# Patient Record
Sex: MALE | Race: WHITE
[De-identification: names, ages, dates, MRNs, and addresses within clinical notes are randomized per-mention and may not be internally consistent; named-entity substitution may affect disease eponyms.]

---

## 2017-10-28 NOTE — HHI.HP
__________________________________________________





HPI


Service


[facility]


Primary Care Physician


Unknown


Admission Diagnosis





anasarca,pleural effusion, liver failure, fever


Diagnoses:  


Chief Complaint:  


Abdominal swelling


Abdominal pain


Bilateral lower extremity swelling


Travel History


International Travel<30 Days:  No


Contact w/Intl Traveler <30 Da:  No


Traveled to Known Affected Are:  No





Sepsis Criteria


SIRS Criteria (2 or more):  Temp > 100.9 or < 96.8, RR  > 20 or PaCO2 < 32


History of Present Illness


Written by Shraddha Foreman, acting as scribe for Dr. Limon on 10/28/17 at 17:

47. 





This is a 56-year-old male with a past medical history significant for alcohol 

abuse, hepatitis C untreated, hypertension and hypothyroidism who presents to 

[facility] ED with complaints of progressive swelling in his abdomen for 

the past 4 weeks.  Patient admits to history of heavy alcohol use and reports 

his last drink was 4-5 days after Labor Day.  Since that time, patient states 

she's been experiencing increased swelling in his abdomen as well as in his 

lower extremities.  About 2-3 weeks ago he began having progressive abdominal 

pain.  His daughter who is at the bedside states that she began to notice his 

eyes turning yellow around Father's Day of this year.  He is having a difficult 

time breathing.  He has begun to have some swelling in his scrotum.  He 

endorses being more tired and sleeping most of the day.  Patient has his 

initial GI appointment scheduled for this Monday and was to begin the process 

to obtain approval to begin hepatitis C treatment.  Patient denies any fever or 

chills.  Patient denies any complaints of nausea or vomiting.  He reports she's 

had a good appetite.  He denies any diarrhea or constipation.  In the ED, 

patient is febrile with temperature 102.8.  White count is within normal 

limits.  LFTs are elevated.  Platelet count is 105.


INR is 1.5.  Chest x-ray shows bilateral pleural effusions right greater than 

left and a hazy opacity greatest in the right lung.  CT of the abdomen and 

pelvis shows bilateral pleural effusions and extensive ascites as well as 

nodular contour to the liver and splenomegaly suggestive of possible portal 

venous hypertension.





Review of Systems


Except as stated in HPI:  all other systems reviewed are Neg





Past Family Social History


Past Medical History


Hypertension


Hypothyroidism


Alcohol abuse


Hepatitis C, untreated


Hx of GIB


Past Surgical History


Ganglion cyst


Tonsillectomy


Reported Medications


Levothyroxine (Levothyroxine Sodium) 50 Mcg Tab 50 Mcg PO DAILY


Protonix (Pantoprazole Sodium) 40 Mg Tabdr 40 Mg PO DAILY


Levothroid (Levothyroxine Sodium) 75 Mcg Tab 75 Mcg PO DAILY


Allergies:  


Coded Allergies:  


     No Known Allergies (Unverified , 12)


Active Ordered Medications





Current Medications








 Medications


  (Trade)  Dose


 Ordered  Sig/Mariana


 Route  Start Time


 Stop Time Status Last Admin


 


  (NS Flush)  2 ml  UNSCH  PRN


 IV FLUSH  10/28/17 14:15


     


 








Family History


Hypertension, CLL, COPD, CHF


Social History


History of previous tobacco use age [age]yr, 1 pack a day.  He admits to 

history of heavy alcohol abuse but states his last drink was over a month ago 

right after Labor Day.  Patient has a history of IV drug use but has not used 

in 25 years.





Physical Exam


Vital Signs





Vital Signs








  Date Time  Temp Pulse Resp B/P (MAP) Pulse Ox O2 Delivery O2 Flow Rate FiO2


 


10/28/17 17:03 99.7 81 18 136/84 (101) 98 Nasal Cannula 2.00 


 


10/28/17 15:18 102.8 81 18 148/88 (108) 96 Nasal Cannula 2.00 


 


10/28/17 14:18   18     


 


10/28/17 14:18   18  97 Nasal Cannula 2.00 


 


10/28/17 13:35 99.4 88 22 186/102 (130) 93   








Physical Exam


GENERAL: This is a ill appearing male in mild distress due to abdominal girth.  

Patient is awake and alert.  Daughters at the bedside.


SKIN: Jaundice.  Warm and dry.


HEAD: Atraumatic. Normocephalic. No temporal or scalp tenderness.


EYES: Pupils equal round and reactive. Extraocular motions intact. (+)scleral 

icterus. 


ENT: Nose without bleeding or purulent drainage. Throat without erythema, 

tonsillar hypertrophy or exudate. Uvula midline. Airway patent.


NECK: Trachea midline. No lymphadenopathy. Supple, nontender, no meningeal 

signs.


CARDIOVASCULAR: Regular rate and rhythm without murmurs, gallops, or rubs. 


RESPIRATORY: Diminished breath sounds throughout.


GASTROINTESTINAL: Limited exam.  Abdomen is tense.  (+)Ascites.  Diffusely 

tender to palpation.  Unable to assess for organomegaly.


MUSCULOSKELETAL: Extremities without clubbing or cyanosis. 2-3+ edema BLE.  No 

joint tenderness, effusion, or edema noted. No calf tenderness. 


NEUROLOGICAL: Awake and alert.  Able to move all extremities.  Normal speech.


Laboratory





Laboratory Tests








Test


  10/28/17


14:37 10/28/17


15:10 10/28/17


15:41


 


White Blood Count 6.0   


 


Red Blood Count 4.38   


 


Hemoglobin 15.1   


 


Hematocrit 42.9   


 


Mean Corpuscular Volume 98.0   


 


Mean Corpuscular Hemoglobin 34.4   


 


Mean Corpuscular Hemoglobin


Concent 35.1 


  


  


 


 


Red Cell Distribution Width 19.1   


 


Platelet Count 105   


 


Mean Platelet Volume 9.2   


 


Neutrophils (%) (Auto) 63.0   


 


Lymphocytes (%) (Auto) 18.7   


 


Monocytes (%) (Auto) 14.7   


 


Eosinophils (%) (Auto) 2.9   


 


Basophils (%) (Auto) 0.7   


 


Neutrophils # (Auto) 3.8   


 


Lymphocytes # (Auto) 1.1   


 


Monocytes # (Auto) 0.9   


 


Eosinophils # (Auto) 0.2   


 


Basophils # (Auto) 0.0   


 


CBC Comment DIFF FINAL   


 


Differential Comment    


 


Prothrombin Time 16.7   


 


Prothromb Time International


Ratio 1.5 


  


  


 


 


Activated Partial


Thromboplast Time 27.7 


  


  


 


 


Urine Color  YELLOW  


 


Urine Turbidity  HAZY  


 


Urine pH  6.0  


 


Urine Specific Gravity  1.012  


 


Urine Protein  NEG  


 


Urine Glucose (UA)  NEG  


 


Urine Ketones  NEG  


 


Urine Occult Blood  NEG  


 


Urine Nitrite  NEG  


 


Urine Bilirubin  NEG  


 


Urine Urobilinogen  2.0  


 


Urine Leukocyte Esterase  NEG  


 


Urine WBC  2  


 


Urine Mucus  FEW  


 


Microscopic Urinalysis Comment


  


  CULT NOT


INDICATED 


 


 


Blood Urea Nitrogen   20 


 


Creatinine   0.97 


 


Random Glucose   111 


 


Total Protein   7.7 


 


Albumin   2.2 


 


Calcium Level   7.7 


 


Alkaline Phosphatase   154 


 


Aspartate Amino Transf


(AST/SGOT) 


  


  149 


 


 


Alanine Aminotransferase


(ALT/SGPT) 


  


  67 


 


 


Total Bilirubin   6.6 


 


Sodium Level   136 


 


Potassium Level   4.2 


 


Chloride Level   105 


 


Carbon Dioxide Level   25.1 


 


Anion Gap   6 


 


Estimat Glomerular Filtration


Rate 


  


  79 


 


 


Gamma Glutamyl Transpeptidase   54 














 Date/Time


Source Procedure


Growth Status


 


 


 10/28/17 15:10


Blood Peripheral Aerobic Blood Culture


Pending Received


 


 10/28/17 15:10


Blood Peripheral Anaerobic Blood Culture


Pending Received








Result Diagram:  


10/28/17 1437                                                                  

              10/28/17 1541





Imaging





Last Impressions








Chest X-Ray 10/28/17 0000 Signed





Impressions: 





 Service Date/Time:  2017 14:27 - CONCLUSION:  1. 

Bilateral 





 pleural effusions right greater than left. 2. Hazy opacity greatest in the 

right 





 lung. 3. Mild cardiomegaly     Andrew Canada MD 


 


Abdomen/Pelvis CT 10/28/17 0000 Signed





Impressions: 





 Service Date/Time:  2017 16:11 - CONCLUSION:  1. 

Bilateral 





 pleural effusions and extensive ascites 2. Nodular contour to the liver and 





 splenomegaly suggests possible portal venous hypertension     Cl Howell MD 











Caprini VTE Risk Assessment


Caprini VTE Risk Assessment:  No/Low Risk (score <= 1)


VTE Pharm Contraindication:  Coagulopathy,INR elevated


Caprini Risk Assessment Model











 Point Value = 1          Point Value = 2  Point Value = 3  Point Value = 5


 


Age 41-60


Minor surgery


BMI > 25 kg/m2


Swollen legs


Varicose veins


Pregnancy or postpartum


History of unexplained or recurrent


   spontaneous 


Oral contraceptives or hormone


   replacement


Sepsis (< 1 month)


Serious lung disease, including


   pneumonia (< 1 month)


Abnormal pulmonary function


Acute myocardial infarction


Congestive heart failure (< 1 month)


History of inflammatory bowel disease


Medical patient at bed rest Age 61-74


Arthroscopic surgery


Major open surgery (> 45 min)


Laparoscopic surgery (> 45 min)


Malignancy


Confined to bed (> 72 hours)


Immobilizing plaster cast


Central venous access Age >= 75


History of VTE


Family history of VTE


Factor V Leiden


Prothrombin 80330F


Lupus anticoagulant


Anticardiolipin antibodies


Elevated serum homocysteine


Heparin-induced thrombocytopenia


Other congenital or acquired


   thrombophilia Stroke (< 1 month)


Elective arthroplasty


Hip, pelvis, or leg fracture


Acute spinal cord injury (< 1 month)








Prophylaxis Regimen











   Total Risk


Factor Score Risk Level Prophylaxis Regimen


 


0-1      Low Early ambulation


 


2 Moderate Order ONE of the following:


*Sequential Compression Device (SCD)


*Heparin 5000 units SQ BID


 


3-4 Higher Order ONE of the following medications:


*Heparin 5000 units SQ TID


*Enoxaparin/Lovenox 40 mg SQ daily (WT < 150 kg, CrCl > 30 mL/min)


*Enoxaparin/Lovenox 30 mg SQ daily (WT < 150 kg, CrCl > 10-29 mL/min)


*Enoxaparin/Lovenox 30 mg SQ BID (WT < 150 kg, CrCl > 30 mL/min)


AND/OR


*Sequential Compression Device (SCD)


 


5 or more Highest Order ONE of the following medications:


*Heparin 5000 units SQ TID (Preferred with Epidurals)


*Enoxaparin/Lovenox 40 mg SQ daily (WT < 150 kg, CrCl > 30 mL/min)


*Enoxaparin/Lovenox 30 mg SQ daily (WT < 150 kg, CrCl > 10-29 mL/min)


*Enoxaparin/Lovenox 30 mg SQ BID (WT < 150 kg, CrCl > 30 mL/min)


AND


*Sequential Compression Device (SCD)











Assessment and Plan


Assessment and Plan


56-year-old male with a past medical history significant for alcohol abuse, 

hepatitis C untreated, hypertension and hypothyroidism who presents to [facility] ED with complaints of progressive swelling in his abdomen for the past 4 

weeks.





SIRS, patient meets SIRS criteria with temperature 102.8 and elevated RR 22.


   - Patient given a dose of Zosyn in the ED


   - Continue IV antibiotic.  Concern for SBP.


   - Supportive care


   - Continuous cardiac monitoring


   - Follow up on blood culture results





Liver failure, likely secondary to cirrhosis 


Anasarca


History of hepatitis C, untreated


History of alcohol abuse


Transaminitis


   - CT of the abdomen and pelvis personally reviewed revealing bilateral 

pleural effusions, extensive ascites, nodular contour of the liver and 

splenomegaly suggestive of possible portal venous hypertension


   - Ultrasound-guided abdominal paracentesis.  Send specimen for fluid studies.


   - Consult GI


   - Patient given dose of IV furosemide in the ED.  Continue Lasix 40mg IV 

BID.  Monitor electrolytes.


   - Spironolactone 25 mg by mouth daily


   - strict I&Os


   - monitor UOP.  Maintain negative fluid status.


   - monitor LFTs


   - Discussed with patient importance of complete alcohol cessation





Dyspnea secondary to pleural effusions


   - Chest x-ray personally reviewed showing bilateral pleural effusions right 

greater than left and hazy opacity greatest in the right lung.


   - Obtain 2-D echo to assess for right-sided heart failure


   - IV diuresis and paracentesis


   - Will continue to monitor.  If dyspnea worsens, patient may require 

thoracentesis.  Will plan on repeat chest x-ray in a few days.





Hypertension


   - Uncontrolled at presentation but improved


   - Monitor





Coagulopathy


Thrombocytopenia


   - Likely secondary to liver disease


   - monitor for any s/sxs of bleeding


   - monitor as indicated





Hypothyroidism


   - Continue patient on home dose of levothyroxine 50 g daily





GI prophylaxis


   - PPI





DVT prophylaxis


   - Avoid chemical prophylaxis given coagulopathy


   - Bilateral SCD/MARY hose





This note was transcribed by gisela [Shraddha Foreman].  I, Dr. Francis Limon 

personally performed the history, physical exam, and medical decision making; 

and confirmed the accuracy of the information in the transcribed note.





Authenticated by Dr. Francis Limon on 10/28/17 at 1755.


Discussed Condition With


Patient, daughter, ED physician





Physician Certification


2 Midnight Certification Type:  Admission for Inpatient Services


Order for Inpatient Services


The services are ordered in accordance with Medicare regulations or non-

Medicare payer requirements, as applicable.  In the case of services not 

specified as inpatient-only, they are appropriately provided as inpatient 

services in accordance with the 2-midnight benchmark.


Estimated LOS (days):  3


3 days is the estimated time the patient will need to remain in the hospital, 

assuming treatment plan goals are met and no additional complications.


Post-Hospital Plan:  Not yet determined











Shraddha Foreman Oct 28, 2017 17:59


Francis Limon MD Oct 28, 2017 23:01

## 2017-10-28 NOTE — HHI.HP
__________________________________________________





HPI


Service


[facility]


Primary Care Physician


Unknown


Admission Diagnosis





anasarca,pleural effusion, liver failure, fever


Diagnoses:  


Chief Complaint:  


Abdominal swelling


Abdominal pain


Bilateral lower extremity swelling


Travel History


International Travel<30 Days:  No


Contact w/Intl Traveler <30 Da:  No


Traveled to Known Affected Are:  No





Sepsis Criteria


SIRS Criteria (2 or more):  Temp > 100.9 or < 96.8, RR  > 20 or PaCO2 < 32


History of Present Illness


Written by Shraddha Foreman, acting as scribe for Dr. Limon on 10/28/17 at 17:

47. 





This is a 56-year-old male with a past medical history significant for alcohol 

abuse, hepatitis C untreated, hypertension and hypothyroidism who presents to 

[facility] ED with complaints of progressive swelling in his abdomen for 

the past 4 weeks.  Patient admits to history of heavy alcohol use and reports 

his last drink was 4-5 days after Labor Day.  Since that time, patient states 

she's been experiencing increased swelling in his abdomen as well as in his 

lower extremities.  About 2-3 weeks ago he began having progressive abdominal 

pain.  His daughter who is at the bedside states that she began to notice his 

eyes turning yellow around Father's Day of this year.  He is having a difficult 

time breathing.  He has begun to have some swelling in his scrotum.  He 

endorses being more tired and sleeping most of the day.  Patient has his 

initial GI appointment scheduled for this Monday and was to begin the process 

to obtain approval to begin hepatitis C treatment.  Patient denies any fever or 

chills.  Patient denies any complaints of nausea or vomiting.  He reports she's 

had a good appetite.  He denies any diarrhea or constipation.  In the ED, 

patient is febrile with temperature 102.8.  White count is within normal 

limits.  LFTs are elevated.  Platelet count is 105.


INR is 1.5.  Chest x-ray shows bilateral pleural effusions right greater than 

left and a hazy opacity greatest in the right lung.  CT of the abdomen and 

pelvis shows bilateral pleural effusions and extensive ascites as well as 

nodular contour to the liver and splenomegaly suggestive of possible portal 

venous hypertension.





Review of Systems


Except as stated in HPI:  all other systems reviewed are Neg





Past Family Social History


Past Medical History


Hypertension


Hypothyroidism


Alcohol abuse


Hepatitis C, untreated


Hx of GIB


Past Surgical History


Ganglion cyst


Tonsillectomy


Reported Medications


Levothyroxine (Levothyroxine Sodium) 50 Mcg Tab 50 Mcg PO DAILY


Protonix (Pantoprazole Sodium) 40 Mg Tabdr 40 Mg PO DAILY


Levothroid (Levothyroxine Sodium) 75 Mcg Tab 75 Mcg PO DAILY


Allergies:  


Coded Allergies:  


     No Known Allergies (Unverified , 12)


Active Ordered Medications





Current Medications








 Medications


  (Trade)  Dose


 Ordered  Sig/Mariana


 Route  Start Time


 Stop Time Status Last Admin


 


  (NS Flush)  2 ml  UNSCH  PRN


 IV FLUSH  10/28/17 14:15


     


 








Family History


Hypertension, CLL, COPD, CHF


Social History


History of previous tobacco use age [age]yr, 1 pack a day.  He admits to 

history of heavy alcohol abuse but states his last drink was over a month ago 

right after Labor Day.  Patient has a history of IV drug use but has not used 

in 25 years.





Physical Exam


Vital Signs





Vital Signs








  Date Time  Temp Pulse Resp B/P (MAP) Pulse Ox O2 Delivery O2 Flow Rate FiO2


 


10/28/17 17:03 99.7 81 18 136/84 (101) 98 Nasal Cannula 2.00 


 


10/28/17 15:18 102.8 81 18 148/88 (108) 96 Nasal Cannula 2.00 


 


10/28/17 14:18   18     


 


10/28/17 14:18   18  97 Nasal Cannula 2.00 


 


10/28/17 13:35 99.4 88 22 186/102 (130) 93   








Physical Exam


GENERAL: This is a ill appearing male in mild distress due to abdominal girth.  

Patient is awake and alert.  Daughters at the bedside.


SKIN: Jaundice.  Warm and dry.


HEAD: Atraumatic. Normocephalic. No temporal or scalp tenderness.


EYES: Pupils equal round and reactive. Extraocular motions intact. (+)scleral 

icterus. 


ENT: Nose without bleeding or purulent drainage. Throat without erythema, 

tonsillar hypertrophy or exudate. Uvula midline. Airway patent.


NECK: Trachea midline. No lymphadenopathy. Supple, nontender, no meningeal 

signs.


CARDIOVASCULAR: Regular rate and rhythm without murmurs, gallops, or rubs. 


RESPIRATORY: Diminished breath sounds throughout.


GASTROINTESTINAL: Limited exam.  Abdomen is tense.  (+)Ascites.  Diffusely 

tender to palpation.  Unable to assess for organomegaly.


MUSCULOSKELETAL: Extremities without clubbing or cyanosis. 2-3+ edema BLE.  No 

joint tenderness, effusion, or edema noted. No calf tenderness. 


NEUROLOGICAL: Awake and alert.  Able to move all extremities.  Normal speech.


Laboratory





Laboratory Tests








Test


  10/28/17


14:37 10/28/17


15:10 10/28/17


15:41


 


White Blood Count 6.0   


 


Red Blood Count 4.38   


 


Hemoglobin 15.1   


 


Hematocrit 42.9   


 


Mean Corpuscular Volume 98.0   


 


Mean Corpuscular Hemoglobin 34.4   


 


Mean Corpuscular Hemoglobin


Concent 35.1 


  


  


 


 


Red Cell Distribution Width 19.1   


 


Platelet Count 105   


 


Mean Platelet Volume 9.2   


 


Neutrophils (%) (Auto) 63.0   


 


Lymphocytes (%) (Auto) 18.7   


 


Monocytes (%) (Auto) 14.7   


 


Eosinophils (%) (Auto) 2.9   


 


Basophils (%) (Auto) 0.7   


 


Neutrophils # (Auto) 3.8   


 


Lymphocytes # (Auto) 1.1   


 


Monocytes # (Auto) 0.9   


 


Eosinophils # (Auto) 0.2   


 


Basophils # (Auto) 0.0   


 


CBC Comment DIFF FINAL   


 


Differential Comment    


 


Prothrombin Time 16.7   


 


Prothromb Time International


Ratio 1.5 


  


  


 


 


Activated Partial


Thromboplast Time 27.7 


  


  


 


 


Urine Color  YELLOW  


 


Urine Turbidity  HAZY  


 


Urine pH  6.0  


 


Urine Specific Gravity  1.012  


 


Urine Protein  NEG  


 


Urine Glucose (UA)  NEG  


 


Urine Ketones  NEG  


 


Urine Occult Blood  NEG  


 


Urine Nitrite  NEG  


 


Urine Bilirubin  NEG  


 


Urine Urobilinogen  2.0  


 


Urine Leukocyte Esterase  NEG  


 


Urine WBC  2  


 


Urine Mucus  FEW  


 


Microscopic Urinalysis Comment


  


  CULT NOT


INDICATED 


 


 


Blood Urea Nitrogen   20 


 


Creatinine   0.97 


 


Random Glucose   111 


 


Total Protein   7.7 


 


Albumin   2.2 


 


Calcium Level   7.7 


 


Alkaline Phosphatase   154 


 


Aspartate Amino Transf


(AST/SGOT) 


  


  149 


 


 


Alanine Aminotransferase


(ALT/SGPT) 


  


  67 


 


 


Total Bilirubin   6.6 


 


Sodium Level   136 


 


Potassium Level   4.2 


 


Chloride Level   105 


 


Carbon Dioxide Level   25.1 


 


Anion Gap   6 


 


Estimat Glomerular Filtration


Rate 


  


  79 


 


 


Gamma Glutamyl Transpeptidase   54 














 Date/Time


Source Procedure


Growth Status


 


 


 10/28/17 15:10


Blood Peripheral Aerobic Blood Culture


Pending Received


 


 10/28/17 15:10


Blood Peripheral Anaerobic Blood Culture


Pending Received








Result Diagram:  


10/28/17 1437                                                                  

              10/28/17 1541





Imaging





Last Impressions








Chest X-Ray 10/28/17 0000 Signed





Impressions: 





 Service Date/Time:  2017 14:27 - CONCLUSION:  1. 

Bilateral 





 pleural effusions right greater than left. 2. Hazy opacity greatest in the 

right 





 lung. 3. Mild cardiomegaly     Andrew Canada MD 


 


Abdomen/Pelvis CT 10/28/17 0000 Signed





Impressions: 





 Service Date/Time:  2017 16:11 - CONCLUSION:  1. 

Bilateral 





 pleural effusions and extensive ascites 2. Nodular contour to the liver and 





 splenomegaly suggests possible portal venous hypertension     Cl Howell MD 











Caprini VTE Risk Assessment


Caprini VTE Risk Assessment:  No/Low Risk (score <= 1)


VTE Pharm Contraindication:  Coagulopathy,INR elevated


Caprini Risk Assessment Model











 Point Value = 1          Point Value = 2  Point Value = 3  Point Value = 5


 


Age 41-60


Minor surgery


BMI > 25 kg/m2


Swollen legs


Varicose veins


Pregnancy or postpartum


History of unexplained or recurrent


   spontaneous 


Oral contraceptives or hormone


   replacement


Sepsis (< 1 month)


Serious lung disease, including


   pneumonia (< 1 month)


Abnormal pulmonary function


Acute myocardial infarction


Congestive heart failure (< 1 month)


History of inflammatory bowel disease


Medical patient at bed rest Age 61-74


Arthroscopic surgery


Major open surgery (> 45 min)


Laparoscopic surgery (> 45 min)


Malignancy


Confined to bed (> 72 hours)


Immobilizing plaster cast


Central venous access Age >= 75


History of VTE


Family history of VTE


Factor V Leiden


Prothrombin 52642X


Lupus anticoagulant


Anticardiolipin antibodies


Elevated serum homocysteine


Heparin-induced thrombocytopenia


Other congenital or acquired


   thrombophilia Stroke (< 1 month)


Elective arthroplasty


Hip, pelvis, or leg fracture


Acute spinal cord injury (< 1 month)








Prophylaxis Regimen











   Total Risk


Factor Score Risk Level Prophylaxis Regimen


 


0-1      Low Early ambulation


 


2 Moderate Order ONE of the following:


*Sequential Compression Device (SCD)


*Heparin 5000 units SQ BID


 


3-4 Higher Order ONE of the following medications:


*Heparin 5000 units SQ TID


*Enoxaparin/Lovenox 40 mg SQ daily (WT < 150 kg, CrCl > 30 mL/min)


*Enoxaparin/Lovenox 30 mg SQ daily (WT < 150 kg, CrCl > 10-29 mL/min)


*Enoxaparin/Lovenox 30 mg SQ BID (WT < 150 kg, CrCl > 30 mL/min)


AND/OR


*Sequential Compression Device (SCD)


 


5 or more Highest Order ONE of the following medications:


*Heparin 5000 units SQ TID (Preferred with Epidurals)


*Enoxaparin/Lovenox 40 mg SQ daily (WT < 150 kg, CrCl > 30 mL/min)


*Enoxaparin/Lovenox 30 mg SQ daily (WT < 150 kg, CrCl > 10-29 mL/min)


*Enoxaparin/Lovenox 30 mg SQ BID (WT < 150 kg, CrCl > 30 mL/min)


AND


*Sequential Compression Device (SCD)











Assessment and Plan


Assessment and Plan


56-year-old male with a past medical history significant for alcohol abuse, 

hepatitis C untreated, hypertension and hypothyroidism who presents to [facility] ED with complaints of progressive swelling in his abdomen for the past 4 

weeks.





SIRS, patient meets SIRS criteria with temperature 102.8 and elevated RR 22.


   - Patient given a dose of Zosyn in the ED


   - Continue IV antibiotic.  Concern for SBP.


   - Supportive care


   - Continuous cardiac monitoring


   - Follow up on blood culture results





Liver failure, likely secondary to cirrhosis 


Anasarca


History of hepatitis C, untreated


History of alcohol abuse


Transaminitis


   - CT of the abdomen and pelvis personally reviewed revealing bilateral 

pleural effusions, extensive ascites, nodular contour of the liver and 

splenomegaly suggestive of possible portal venous hypertension


   - Ultrasound-guided abdominal paracentesis.  Send specimen for fluid studies.


   - Consult GI


   - Patient given dose of IV furosemide in the ED.  Continue Lasix 40mg IV 

BID.  Monitor electrolytes.


   - Spironolactone 25 mg by mouth daily


   - strict I&Os


   - monitor UOP.  Maintain negative fluid status.


   - monitor LFTs


   - Discussed with patient importance of complete alcohol cessation





Dyspnea secondary to pleural effusions


   - Chest x-ray personally reviewed showing bilateral pleural effusions right 

greater than left and hazy opacity greatest in the right lung.


   - Obtain 2-D echo to assess for right-sided heart failure


   - IV diuresis and paracentesis


   - Will continue to monitor.  If dyspnea worsens, patient may require 

thoracentesis.  Will plan on repeat chest x-ray in a few days.





Hypertension


   - Uncontrolled at presentation but improved


   - Monitor





Coagulopathy


Thrombocytopenia


   - Likely secondary to liver disease


   - monitor for any s/sxs of bleeding


   - monitor as indicated





Hypothyroidism


   - Continue patient on home dose of levothyroxine 50 g daily





GI prophylaxis


   - PPI





DVT prophylaxis


   - Avoid chemical prophylaxis given coagulopathy


   - Bilateral SCD/MARY hose





This note was transcribed by gisela [Shraddha Foreman].  I, Dr. Francis Limon 

personally performed the history, physical exam, and medical decision making; 

and confirmed the accuracy of the information in the transcribed note.





Authenticated by Dr. Francis Limon on 10/28/17 at 1755.


Discussed Condition With


Patient, daughter, ED physician





Physician Certification


2 Midnight Certification Type:  Admission for Inpatient Services


Order for Inpatient Services


The services are ordered in accordance with Medicare regulations or non-

Medicare payer requirements, as applicable.  In the case of services not 

specified as inpatient-only, they are appropriately provided as inpatient 

services in accordance with the 2-midnight benchmark.


Estimated LOS (days):  3


3 days is the estimated time the patient will need to remain in the hospital, 

assuming treatment plan goals are met and no additional complications.


Post-Hospital Plan:  Not yet determined











Shraddha Foreman Oct 28, 2017 17:59


Francis Limon MD Oct 28, 2017 23:01

## 2017-10-28 NOTE — PD
HPI


Chief Complaint:  Abdominal Pain


Time Seen by Provider:  13:44


Travel History


International Travel<30 days:  No


Contact w/Intl Traveler<30days:  No


Traveled to known affect area:  No





History of Present Illness


HPI


This patient complains of excessive swelling of his legs including scrotum and 

extreme swelling of the abdomen.  Duration 3-4 weeks.  Patient has history of 

hepatitis C diagnosed in 2012 and has a long history of excessive alcohol use 

although he has cut back recently.  He hasn't drank in the last month.  He's 

been referred to GI physician recently but has not seen him yet.  Symptoms are 

severe.  He is jaundiced.  No alleviating factors.  Symptoms exacerbated by 

alcohol abuse and hep C.  No IV drug use in the last 25 years.





PFSH


Past Medical History


Cancer:  No


Cardiovascular Problems:  No


Diabetes:  No


Endocrine:  Yes


Genitourinary:  No


Musculoskeletal:  No


Neurologic:  No


Psychiatric:  No


Reproductive:  No


Respiratory:  No


Thyroid Disease:  Yes





Past Surgical History


Pacemaker:  No


Tonsillectomy:  Yes


Other Surgery:  Yes (GANGLION CYST LEFT WRIST )





Social History


Alcohol Use:  Yes (WEEKLY BEERS)


Tobacco Use:  No


Substance Use:  No





Allergies-Medications


(Allergen,Severity, Reaction):  


Coded Allergies:  


     No Known Allergies (Unverified , 6/13/12)


Reported Meds & Prescriptions





Reported Meds & Active Scripts


Active


Reported


Levothyroxine (Levothyroxine Sodium) 50 Mcg Tab 50 Mcg PO DAILY


Protonix (Pantoprazole Sodium) 40 Mg Tabdr 40 Mg PO DAILY


Levothroid (Levothyroxine Sodium) 75 Mcg Tab 75 Mcg PO DAILY








Review of Systems


General / Constitutional:  No: Fever


Eyes:  No: Visual changes


HENT:  No: Headaches


Cardiovascular:  Positive: Edema, No: Chest Pain or Discomfort


Respiratory:  Positive: Shortness of Breath


Gastrointestinal:  No: Abdominal Pain


Genitourinary:  No: Dysuria


Musculoskeletal:  Positive: Edema, No: Pain


Skin:  No Rash


Neurologic:  No: Weakness


Psychiatric:  Positive: Substance Abuse, No: Depression


Endocrine:  No: Polydipsia


Hematologic/Lymphatic:  No: Easy Bruising





Physical Exam


Narrative


GENERAL: Well-nourished, well-developed patient in no apparent distress.


SKIN: Focused skin assessment reveals no rash and nodules. Skin is Warm and 

dry.  Has a jaundiced coloration


HEAD: Atraumatic. Normocephalic. 


EYES: Pupils equal and round.  Positive scleral icterus. No injection or 

drainage. 


ENT: No nasal bleeding or discharge.  Mucous membranes pink and moist.


NECK: Trachea midline. No JVD. 


CARDIOVASCULAR: Regular rate and rhythm.  No murmur appreciated.


RESPIRATORY: No accessory muscle use. Clear to auscultation. Breath sounds 

equal bilaterally. 


GASTROINTESTINAL: Abdomen soft, non-tender, massive distention from ascites 

Hepatic and splenic margins not palpable.  Scrotum is enlarged diffusely


MUSCULOSKELETAL: No obvious deformities. No clubbing.  No cyanosis.  Pitting 

edema of the upper and lower legs and feet.  No edema. 


NEUROLOGICAL: Awake and alert. No obvious cranial nerve deficits.  Motor 

grossly within normal limits. Normal speech.


PSYCHIATRIC: Appropriate mood and affect; insight and judgment poor .





Data


Data


Last Documented VS





Vital Signs








  Date Time  Temp Pulse Resp B/P (MAP) Pulse Ox O2 Delivery O2 Flow Rate FiO2


 


10/28/17 15:18 102.8 81 18 148/88 (108) 96 Nasal Cannula 2.00 








Orders





 Orders


Complete Blood Count With Diff (10/28/17 14:03)


Comprehensive Metabolic Panel (10/28/17 14:03)


Prothrombin Time / Inr (Pt) (10/28/17 14:03)


Act Partial Throm Time (Ptt) (10/28/17 14:03)


Iv Access Insert/Monitor (10/28/17 14:03)


Ecg Monitoring (10/28/17 14:03)


Oximetry (10/28/17 14:03)


Sodium Chloride 0.9% Flush (Ns Flush) (10/28/17 14:15)


Furosemide Inj (Lasix Inj) (10/28/17 14:15)


Chest, Single Ap (10/28/17 )


Gamma Gt (Ggt) (10/28/17 14:10)


Ct Abd/Pel W/O Iv Contrast (10/28/17 )


Blood Culture (10/28/17 14:51)


Piperacil-Tazo 3.375 Gm Premix (Zosyn 3. (10/28/17 15:00)


Urinalysis - C+S If Indicated (10/28/17 14:51)





Labs





Laboratory Tests








Test


  10/28/17


14:37 10/28/17


15:10 10/28/17


15:41


 


White Blood Count 6.0 TH/MM3   


 


Red Blood Count 4.38 MIL/MM3   


 


Hemoglobin 15.1 GM/DL   


 


Hematocrit 42.9 %   


 


Mean Corpuscular Volume 98.0 FL   


 


Mean Corpuscular Hemoglobin 34.4 PG   


 


Mean Corpuscular Hemoglobin


Concent 35.1 % 


  


  


 


 


Red Cell Distribution Width 19.1 %   


 


Platelet Count 105 TH/MM3   


 


Mean Platelet Volume 9.2 FL   


 


Neutrophils (%) (Auto) 63.0 %   


 


Lymphocytes (%) (Auto) 18.7 %   


 


Monocytes (%) (Auto) 14.7 %   


 


Eosinophils (%) (Auto) 2.9 %   


 


Basophils (%) (Auto) 0.7 %   


 


Neutrophils # (Auto) 3.8 TH/MM3   


 


Lymphocytes # (Auto) 1.1 TH/MM3   


 


Monocytes # (Auto) 0.9 TH/MM3   


 


Eosinophils # (Auto) 0.2 TH/MM3   


 


Basophils # (Auto) 0.0 TH/MM3   


 


CBC Comment DIFF FINAL   


 


Differential Comment    


 


Prothrombin Time 16.7 SEC   


 


Prothromb Time International


Ratio 1.5 RATIO 


  


  


 


 


Activated Partial


Thromboplast Time 27.7 SEC 


  


  


 


 


Urine Color  YELLOW  


 


Urine Turbidity  HAZY  


 


Urine pH  6.0  


 


Urine Specific Gravity  1.012  


 


Urine Protein  NEG mg/dL  


 


Urine Glucose (UA)  NEG mg/dL  


 


Urine Ketones  NEG mg/dL  


 


Urine Occult Blood  NEG  


 


Urine Nitrite  NEG  


 


Urine Bilirubin  NEG  


 


Urine Urobilinogen  2.0 MG/DL  


 


Urine Leukocyte Esterase  NEG  


 


Urine WBC  2 /hpf  


 


Urine Mucus  FEW /lpf  


 


Microscopic Urinalysis Comment


  


  CULT NOT


INDICATED 


 


 


Blood Urea Nitrogen   20 MG/DL 


 


Creatinine   0.97 MG/DL 


 


Random Glucose   111 MG/DL 


 


Total Protein   7.7 GM/DL 


 


Albumin   2.2 GM/DL 


 


Calcium Level   7.7 MG/DL 


 


Alkaline Phosphatase   154 U/L 


 


Aspartate Amino Transf


(AST/SGOT) 


  


  149 U/L 


 


 


Alanine Aminotransferase


(ALT/SGPT) 


  


  67 U/L 


 


 


Total Bilirubin   6.6 MG/DL 


 


Sodium Level   136 MEQ/L 


 


Potassium Level   4.2 MEQ/L 


 


Chloride Level   105 MEQ/L 


 


Carbon Dioxide Level   25.1 MEQ/L 


 


Anion Gap   6 MEQ/L 


 


Estimat Glomerular Filtration


Rate 


  


  79 ML/MIN 


 


 


Gamma Glutamyl Transpeptidase   54 U/L 











[facility]


Medical Decision Making


Medical Screen Exam Complete:  Yes


Emergency Medical Condition:  Yes


Medical Record Reviewed:  Yes


Differential Diagnosis


Anasarca, liver failure, renal failure


Narrative Course


I have reviewed the patient's electronic medical record.





IV placed


CBC shows normal hemoglobin with mild thrombocytopenia 


metabolic profile with normal renal function


LFT's show elevated bili at 6.6 and normal GGT with fairly normal LFTs


Coagulation studies show INR 1.5, elevated


CT of abdomen and pelvis has been done but reading is pending


GGT is normal


I gave him 60 mg IV Lasix


I reviewed his chest x-ray which reveals significant right sided pleural 

effusion and smaller left pleural effusion and question of hazy right sided 

infiltrate


Patient initially was afebrile but on recheck temperature was 102.8


I obtained 2 blood cultures and gave him a dose of IV Zosyn


He has massive ascites but no abdominal pain or abdominal tenderness to suggest 

SBP but that is a possibility


Urine is clean





Patient will require inpatient admission.  I paged hospitalist to discuss.  He 

has massive anasarca and ascites with dyspnea and pleural effusions as well as 

febrile illness.  He has liver failure likely from hep C and alcoholism.  He is 

jaundiced.  CT was done to evaluate for potential obstructive lesion although 

this is not highly suspected.  The reading is pending at this time.





Diagnosis





 Primary Impression:  


 Anasarca


 Additional Impressions:  


 Acute liver failure


 Qualified Codes:  K72.00 - Acute and subacute hepatic failure without coma


 Pleural effusion associated with hepatic disorder


 Fever


 Qualified Codes:  R50.9 - Fever, unspecified





Admitting Information


Admitting Physician Requests:  it











Alok Reyes MD Oct 28, 2017 14:09

## 2017-10-28 NOTE — PD
HPI


Chief Complaint:  Abdominal Pain


Time Seen by Provider:  13:44


Travel History


International Travel<30 days:  No


Contact w/Intl Traveler<30days:  No


Traveled to known affect area:  No





History of Present Illness


HPI


This patient complains of excessive swelling of his legs including scrotum and 

extreme swelling of the abdomen.  Duration 3-4 weeks.  Patient has history of 

hepatitis C diagnosed in 2012 and has a long history of excessive alcohol use 

although he has cut back recently.  He hasn't drank in the last month.  He's 

been referred to GI physician recently but has not seen him yet.  Symptoms are 

severe.  He is jaundiced.  No alleviating factors.  Symptoms exacerbated by 

alcohol abuse and hep C.  No IV drug use in the last 25 years.





PFSH


Past Medical History


Cancer:  No


Cardiovascular Problems:  No


Diabetes:  No


Endocrine:  Yes


Genitourinary:  No


Musculoskeletal:  No


Neurologic:  No


Psychiatric:  No


Reproductive:  No


Respiratory:  No


Thyroid Disease:  Yes





Past Surgical History


Pacemaker:  No


Tonsillectomy:  Yes


Other Surgery:  Yes (GANGLION CYST LEFT WRIST )





Social History


Alcohol Use:  Yes (WEEKLY BEERS)


Tobacco Use:  No


Substance Use:  No





Allergies-Medications


(Allergen,Severity, Reaction):  


Coded Allergies:  


     No Known Allergies (Unverified , 6/13/12)


Reported Meds & Prescriptions





Reported Meds & Active Scripts


Active


Reported


Levothyroxine (Levothyroxine Sodium) 50 Mcg Tab 50 Mcg PO DAILY


Protonix (Pantoprazole Sodium) 40 Mg Tabdr 40 Mg PO DAILY


Levothroid (Levothyroxine Sodium) 75 Mcg Tab 75 Mcg PO DAILY








Review of Systems


General / Constitutional:  No: Fever


Eyes:  No: Visual changes


HENT:  No: Headaches


Cardiovascular:  Positive: Edema, No: Chest Pain or Discomfort


Respiratory:  Positive: Shortness of Breath


Gastrointestinal:  No: Abdominal Pain


Genitourinary:  No: Dysuria


Musculoskeletal:  Positive: Edema, No: Pain


Skin:  No Rash


Neurologic:  No: Weakness


Psychiatric:  Positive: Substance Abuse, No: Depression


Endocrine:  No: Polydipsia


Hematologic/Lymphatic:  No: Easy Bruising





Physical Exam


Narrative


GENERAL: Well-nourished, well-developed patient in no apparent distress.


SKIN: Focused skin assessment reveals no rash and nodules. Skin is Warm and 

dry.  Has a jaundiced coloration


HEAD: Atraumatic. Normocephalic. 


EYES: Pupils equal and round.  Positive scleral icterus. No injection or 

drainage. 


ENT: No nasal bleeding or discharge.  Mucous membranes pink and moist.


NECK: Trachea midline. No JVD. 


CARDIOVASCULAR: Regular rate and rhythm.  No murmur appreciated.


RESPIRATORY: No accessory muscle use. Clear to auscultation. Breath sounds 

equal bilaterally. 


GASTROINTESTINAL: Abdomen soft, non-tender, massive distention from ascites 

Hepatic and splenic margins not palpable.  Scrotum is enlarged diffusely


MUSCULOSKELETAL: No obvious deformities. No clubbing.  No cyanosis.  Pitting 

edema of the upper and lower legs and feet.  No edema. 


NEUROLOGICAL: Awake and alert. No obvious cranial nerve deficits.  Motor 

grossly within normal limits. Normal speech.


PSYCHIATRIC: Appropriate mood and affect; insight and judgment poor .





Data


Data


Last Documented VS





Vital Signs








  Date Time  Temp Pulse Resp B/P (MAP) Pulse Ox O2 Delivery O2 Flow Rate FiO2


 


10/28/17 15:18 102.8 81 18 148/88 (108) 96 Nasal Cannula 2.00 








Orders





 Orders


Complete Blood Count With Diff (10/28/17 14:03)


Comprehensive Metabolic Panel (10/28/17 14:03)


Prothrombin Time / Inr (Pt) (10/28/17 14:03)


Act Partial Throm Time (Ptt) (10/28/17 14:03)


Iv Access Insert/Monitor (10/28/17 14:03)


Ecg Monitoring (10/28/17 14:03)


Oximetry (10/28/17 14:03)


Sodium Chloride 0.9% Flush (Ns Flush) (10/28/17 14:15)


Furosemide Inj (Lasix Inj) (10/28/17 14:15)


Chest, Single Ap (10/28/17 )


Gamma Gt (Ggt) (10/28/17 14:10)


Ct Abd/Pel W/O Iv Contrast (10/28/17 )


Blood Culture (10/28/17 14:51)


Piperacil-Tazo 3.375 Gm Premix (Zosyn 3. (10/28/17 15:00)


Urinalysis - C+S If Indicated (10/28/17 14:51)





Labs





Laboratory Tests








Test


  10/28/17


14:37 10/28/17


15:10 10/28/17


15:41


 


White Blood Count 6.0 TH/MM3   


 


Red Blood Count 4.38 MIL/MM3   


 


Hemoglobin 15.1 GM/DL   


 


Hematocrit 42.9 %   


 


Mean Corpuscular Volume 98.0 FL   


 


Mean Corpuscular Hemoglobin 34.4 PG   


 


Mean Corpuscular Hemoglobin


Concent 35.1 % 


  


  


 


 


Red Cell Distribution Width 19.1 %   


 


Platelet Count 105 TH/MM3   


 


Mean Platelet Volume 9.2 FL   


 


Neutrophils (%) (Auto) 63.0 %   


 


Lymphocytes (%) (Auto) 18.7 %   


 


Monocytes (%) (Auto) 14.7 %   


 


Eosinophils (%) (Auto) 2.9 %   


 


Basophils (%) (Auto) 0.7 %   


 


Neutrophils # (Auto) 3.8 TH/MM3   


 


Lymphocytes # (Auto) 1.1 TH/MM3   


 


Monocytes # (Auto) 0.9 TH/MM3   


 


Eosinophils # (Auto) 0.2 TH/MM3   


 


Basophils # (Auto) 0.0 TH/MM3   


 


CBC Comment DIFF FINAL   


 


Differential Comment    


 


Prothrombin Time 16.7 SEC   


 


Prothromb Time International


Ratio 1.5 RATIO 


  


  


 


 


Activated Partial


Thromboplast Time 27.7 SEC 


  


  


 


 


Urine Color  YELLOW  


 


Urine Turbidity  HAZY  


 


Urine pH  6.0  


 


Urine Specific Gravity  1.012  


 


Urine Protein  NEG mg/dL  


 


Urine Glucose (UA)  NEG mg/dL  


 


Urine Ketones  NEG mg/dL  


 


Urine Occult Blood  NEG  


 


Urine Nitrite  NEG  


 


Urine Bilirubin  NEG  


 


Urine Urobilinogen  2.0 MG/DL  


 


Urine Leukocyte Esterase  NEG  


 


Urine WBC  2 /hpf  


 


Urine Mucus  FEW /lpf  


 


Microscopic Urinalysis Comment


  


  CULT NOT


INDICATED 


 


 


Blood Urea Nitrogen   20 MG/DL 


 


Creatinine   0.97 MG/DL 


 


Random Glucose   111 MG/DL 


 


Total Protein   7.7 GM/DL 


 


Albumin   2.2 GM/DL 


 


Calcium Level   7.7 MG/DL 


 


Alkaline Phosphatase   154 U/L 


 


Aspartate Amino Transf


(AST/SGOT) 


  


  149 U/L 


 


 


Alanine Aminotransferase


(ALT/SGPT) 


  


  67 U/L 


 


 


Total Bilirubin   6.6 MG/DL 


 


Sodium Level   136 MEQ/L 


 


Potassium Level   4.2 MEQ/L 


 


Chloride Level   105 MEQ/L 


 


Carbon Dioxide Level   25.1 MEQ/L 


 


Anion Gap   6 MEQ/L 


 


Estimat Glomerular Filtration


Rate 


  


  79 ML/MIN 


 


 


Gamma Glutamyl Transpeptidase   54 U/L 











[facility]


Medical Decision Making


Medical Screen Exam Complete:  Yes


Emergency Medical Condition:  Yes


Medical Record Reviewed:  Yes


Differential Diagnosis


Anasarca, liver failure, renal failure


Narrative Course


I have reviewed the patient's electronic medical record.





IV placed


CBC shows normal hemoglobin with mild thrombocytopenia 


metabolic profile with normal renal function


LFT's show elevated bili at 6.6 and normal GGT with fairly normal LFTs


Coagulation studies show INR 1.5, elevated


CT of abdomen and pelvis has been done but reading is pending


GGT is normal


I gave him 60 mg IV Lasix


I reviewed his chest x-ray which reveals significant right sided pleural 

effusion and smaller left pleural effusion and question of hazy right sided 

infiltrate


Patient initially was afebrile but on recheck temperature was 102.8


I obtained 2 blood cultures and gave him a dose of IV Zosyn


He has massive ascites but no abdominal pain or abdominal tenderness to suggest 

SBP but that is a possibility


Urine is clean





Patient will require inpatient admission.  I paged hospitalist to discuss.  He 

has massive anasarca and ascites with dyspnea and pleural effusions as well as 

febrile illness.  He has liver failure likely from hep C and alcoholism.  He is 

jaundiced.  CT was done to evaluate for potential obstructive lesion although 

this is not highly suspected.  The reading is pending at this time.





Diagnosis





 Primary Impression:  


 Anasarca


 Additional Impressions:  


 Acute liver failure


 Qualified Codes:  K72.00 - Acute and subacute hepatic failure without coma


 Pleural effusion associated with hepatic disorder


 Fever


 Qualified Codes:  R50.9 - Fever, unspecified





Admitting Information


Admitting Physician Requests:  it











Alok Reyes MD Oct 28, 2017 14:09

## 2017-10-28 NOTE — PD
HPI


Chief Complaint:  Abdominal Pain


Time Seen by Provider:  13:44


Travel History


International Travel<30 days:  No


Contact w/Intl Traveler<30days:  No


Traveled to known affect area:  No





History of Present Illness


HPI


This patient complains of excessive swelling of his legs including scrotum and 

extreme swelling of the abdomen.  Duration 3-4 weeks.  Patient has history of 

hepatitis C diagnosed in 2012 and has a long history of excessive alcohol use 

although he has cut back recently.  He hasn't drank in the last month.  He's 

been referred to GI physician recently but has not seen him yet.  Symptoms are 

severe.  He is jaundiced.  No alleviating factors.  Symptoms exacerbated by 

alcohol abuse and hep C.  No IV drug use in the last 25 years.





PFSH


Past Medical History


Cancer:  No


Cardiovascular Problems:  No


Diabetes:  No


Endocrine:  Yes


Genitourinary:  No


Musculoskeletal:  No


Neurologic:  No


Psychiatric:  No


Reproductive:  No


Respiratory:  No


Thyroid Disease:  Yes





Past Surgical History


Pacemaker:  No


Tonsillectomy:  Yes


Other Surgery:  Yes (GANGLION CYST LEFT WRIST )





Social History


Alcohol Use:  Yes (WEEKLY BEERS)


Tobacco Use:  No


Substance Use:  No





Allergies-Medications


(Allergen,Severity, Reaction):  


Coded Allergies:  


     No Known Allergies (Unverified , 6/13/12)


Reported Meds & Prescriptions





Reported Meds & Active Scripts


Active


Reported


Levothyroxine (Levothyroxine Sodium) 50 Mcg Tab 50 Mcg PO DAILY


Protonix (Pantoprazole Sodium) 40 Mg Tabdr 40 Mg PO DAILY


Levothroid (Levothyroxine Sodium) 75 Mcg Tab 75 Mcg PO DAILY








Review of Systems


General / Constitutional:  No: Fever


Eyes:  No: Visual changes


HENT:  No: Headaches


Cardiovascular:  Positive: Edema, No: Chest Pain or Discomfort


Respiratory:  Positive: Shortness of Breath


Gastrointestinal:  No: Abdominal Pain


Genitourinary:  No: Dysuria


Musculoskeletal:  Positive: Edema, No: Pain


Skin:  No Rash


Neurologic:  No: Weakness


Psychiatric:  Positive: Substance Abuse, No: Depression


Endocrine:  No: Polydipsia


Hematologic/Lymphatic:  No: Easy Bruising





Physical Exam


Narrative


GENERAL: Well-nourished, well-developed patient in no apparent distress.


SKIN: Focused skin assessment reveals no rash and nodules. Skin is Warm and 

dry.  Has a jaundiced coloration


HEAD: Atraumatic. Normocephalic. 


EYES: Pupils equal and round.  Positive scleral icterus. No injection or 

drainage. 


ENT: No nasal bleeding or discharge.  Mucous membranes pink and moist.


NECK: Trachea midline. No JVD. 


CARDIOVASCULAR: Regular rate and rhythm.  No murmur appreciated.


RESPIRATORY: No accessory muscle use. Clear to auscultation. Breath sounds 

equal bilaterally. 


GASTROINTESTINAL: Abdomen soft, non-tender, massive distention from ascites 

Hepatic and splenic margins not palpable.  Scrotum is enlarged diffusely


MUSCULOSKELETAL: No obvious deformities. No clubbing.  No cyanosis.  Pitting 

edema of the upper and lower legs and feet.  No edema. 


NEUROLOGICAL: Awake and alert. No obvious cranial nerve deficits.  Motor 

grossly within normal limits. Normal speech.


PSYCHIATRIC: Appropriate mood and affect; insight and judgment poor .





Data


Data


Last Documented VS





Vital Signs








  Date Time  Temp Pulse Resp B/P (MAP) Pulse Ox O2 Delivery O2 Flow Rate FiO2


 


10/28/17 15:18 102.8 81 18 148/88 (108) 96 Nasal Cannula 2.00 








Orders





 Orders


Complete Blood Count With Diff (10/28/17 14:03)


Comprehensive Metabolic Panel (10/28/17 14:03)


Prothrombin Time / Inr (Pt) (10/28/17 14:03)


Act Partial Throm Time (Ptt) (10/28/17 14:03)


Iv Access Insert/Monitor (10/28/17 14:03)


Ecg Monitoring (10/28/17 14:03)


Oximetry (10/28/17 14:03)


Sodium Chloride 0.9% Flush (Ns Flush) (10/28/17 14:15)


Furosemide Inj (Lasix Inj) (10/28/17 14:15)


Chest, Single Ap (10/28/17 )


Gamma Gt (Ggt) (10/28/17 14:10)


Ct Abd/Pel W/O Iv Contrast (10/28/17 )


Blood Culture (10/28/17 14:51)


Piperacil-Tazo 3.375 Gm Premix (Zosyn 3. (10/28/17 15:00)


Urinalysis - C+S If Indicated (10/28/17 14:51)





Labs





Laboratory Tests








Test


  10/28/17


14:37 10/28/17


15:10 10/28/17


15:41


 


White Blood Count 6.0 TH/MM3   


 


Red Blood Count 4.38 MIL/MM3   


 


Hemoglobin 15.1 GM/DL   


 


Hematocrit 42.9 %   


 


Mean Corpuscular Volume 98.0 FL   


 


Mean Corpuscular Hemoglobin 34.4 PG   


 


Mean Corpuscular Hemoglobin


Concent 35.1 % 


  


  


 


 


Red Cell Distribution Width 19.1 %   


 


Platelet Count 105 TH/MM3   


 


Mean Platelet Volume 9.2 FL   


 


Neutrophils (%) (Auto) 63.0 %   


 


Lymphocytes (%) (Auto) 18.7 %   


 


Monocytes (%) (Auto) 14.7 %   


 


Eosinophils (%) (Auto) 2.9 %   


 


Basophils (%) (Auto) 0.7 %   


 


Neutrophils # (Auto) 3.8 TH/MM3   


 


Lymphocytes # (Auto) 1.1 TH/MM3   


 


Monocytes # (Auto) 0.9 TH/MM3   


 


Eosinophils # (Auto) 0.2 TH/MM3   


 


Basophils # (Auto) 0.0 TH/MM3   


 


CBC Comment DIFF FINAL   


 


Differential Comment    


 


Prothrombin Time 16.7 SEC   


 


Prothromb Time International


Ratio 1.5 RATIO 


  


  


 


 


Activated Partial


Thromboplast Time 27.7 SEC 


  


  


 


 


Urine Color  YELLOW  


 


Urine Turbidity  HAZY  


 


Urine pH  6.0  


 


Urine Specific Gravity  1.012  


 


Urine Protein  NEG mg/dL  


 


Urine Glucose (UA)  NEG mg/dL  


 


Urine Ketones  NEG mg/dL  


 


Urine Occult Blood  NEG  


 


Urine Nitrite  NEG  


 


Urine Bilirubin  NEG  


 


Urine Urobilinogen  2.0 MG/DL  


 


Urine Leukocyte Esterase  NEG  


 


Urine WBC  2 /hpf  


 


Urine Mucus  FEW /lpf  


 


Microscopic Urinalysis Comment


  


  CULT NOT


INDICATED 


 


 


Blood Urea Nitrogen   20 MG/DL 


 


Creatinine   0.97 MG/DL 


 


Random Glucose   111 MG/DL 


 


Total Protein   7.7 GM/DL 


 


Albumin   2.2 GM/DL 


 


Calcium Level   7.7 MG/DL 


 


Alkaline Phosphatase   154 U/L 


 


Aspartate Amino Transf


(AST/SGOT) 


  


  149 U/L 


 


 


Alanine Aminotransferase


(ALT/SGPT) 


  


  67 U/L 


 


 


Total Bilirubin   6.6 MG/DL 


 


Sodium Level   136 MEQ/L 


 


Potassium Level   4.2 MEQ/L 


 


Chloride Level   105 MEQ/L 


 


Carbon Dioxide Level   25.1 MEQ/L 


 


Anion Gap   6 MEQ/L 


 


Estimat Glomerular Filtration


Rate 


  


  79 ML/MIN 


 


 


Gamma Glutamyl Transpeptidase   54 U/L 











[facility]


Medical Decision Making


Medical Screen Exam Complete:  Yes


Emergency Medical Condition:  Yes


Medical Record Reviewed:  Yes


Differential Diagnosis


Anasarca, liver failure, renal failure


Narrative Course


I have reviewed the patient's electronic medical record.





IV placed


CBC shows normal hemoglobin with mild thrombocytopenia 


metabolic profile with normal renal function


LFT's show elevated bili at 6.6 and normal GGT with fairly normal LFTs


Coagulation studies show INR 1.5, elevated


CT of abdomen and pelvis has been done but reading is pending


GGT is normal


I gave him 60 mg IV Lasix


I reviewed his chest x-ray which reveals significant right sided pleural 

effusion and smaller left pleural effusion and question of hazy right sided 

infiltrate


Patient initially was afebrile but on recheck temperature was 102.8


I obtained 2 blood cultures and gave him a dose of IV Zosyn


He has massive ascites but no abdominal pain or abdominal tenderness to suggest 

SBP but that is a possibility


Urine is clean





Patient will require inpatient admission.  I paged hospitalist to discuss.  He 

has massive anasarca and ascites with dyspnea and pleural effusions as well as 

febrile illness.  He has liver failure likely from hep C and alcoholism.  He is 

jaundiced.  CT was done to evaluate for potential obstructive lesion although 

this is not highly suspected.  The reading is pending at this time.





Diagnosis





 Primary Impression:  


 Anasarca


 Additional Impressions:  


 Acute liver failure


 Qualified Codes:  K72.00 - Acute and subacute hepatic failure without coma


 Pleural effusion associated with hepatic disorder


 Fever


 Qualified Codes:  R50.9 - Fever, unspecified





Admitting Information


Admitting Physician Requests:  it











Alok Reyes MD Oct 28, 2017 14:09

## 2017-10-28 NOTE — RADRPT
EXAM DATE/TIME:  10/28/2017 16:11 

 

[facility] COMPARISON:     

No previous studies available for comparison.

 

 

INDICATIONS :     

Abdomen pain and distension. 

                  

 

ORAL CONTRAST:      

No oral contrast ingested.

                  

 

RADIATION DOSE:     

23.35 CTDIvol (mGy) 

 

 

MEDICAL HISTORY :     

None  

 

SURGICAL HISTORY :      

None. 

 

ENCOUNTER:      

Initial

 

ACUITY:      

1 day

 

PAIN SCALE:      

6/10

 

LOCATION:       

Bilateral  abdomen

 

TECHNIQUE:     

Volumetric scanning of the abdomen and pelvis was performed.  Using automated exposure control and ad
justment of the mA and/or kV according to patient size, radiation dose was kept as low as reasonably 
achievable to obtain optimal diagnostic quality images.  DICOM format image data is available electro
nically for review and comparison.  

 

FINDINGS:     

The examination is performed without intravenous and without oral contrast.  There is a large right p
leural effusion measuring up to 7 cm in thickness.  There is a small left pleural effusion measuring 
2 cm.  There is extensive ascites throughout the abdomen pelvis causing a protuberant abdomen.  Loops
 of small bowel are deviated medially.  No definite dilated loops seen.  There is a nodular configura
tion to the omentum which is nonspecific cannot be further characterized on a noncontrast study.

 

There is same nodular surface contour of the liver and atrophy of the right lobe of the liver.  The s
pleen is enlarged measuring 16 cm in superior/inferior dimension.  No calcified gallstones.  The kidn
eys are unremarkable for noncontrast technique.

 

There is induration of the subcutaneous fat about the anterior lateral pelvic region.  No radiopaque 
foreign bodies.  Urinary bladder margins are smooth.

 

CONCLUSION:     

1. Bilateral pleural effusions and extensive ascites

2. Nodular contour to the liver and splenomegaly suggests possible portal venous hypertension

 

 

 

 Cl Howell MD on October 28, 2017 at 16:42           

Board Certified Radiologist.

 This report was verified electronically.

## 2017-10-28 NOTE — RADRPT
EXAM DATE/TIME:  10/28/2017 14:27 

 

[facility] COMPARISON:     

No previous studies available for comparison.

 

                     

INDICATIONS :     

Shortness of breath.

                     

 

MEDICAL HISTORY :     

None.          

 

SURGICAL HISTORY :     

None.   

 

ENCOUNTER:     

Initial                                        

 

ACUITY:     

2 weeks      

 

PAIN SCORE:     

0/10

 

LOCATION:     

Bilateral chest 

 

FINDINGS:     

A single AP erect portable view of the chest was obtained and demonstrates bilateral pleural effusion
s with blunting of the costophrenic angles right greater than left. There is hazy opacity in the righ
t lung greatest at the lung base. There is milder hazy opacity at the left lung base. The heart size 
appears mildly prominent. The bony thorax is intact with an old healed left midclavicular fracture.

 

CONCLUSION:     

1. Bilateral pleural effusions right greater than left.

2. Hazy opacity greatest in the right lung.

3. Mild cardiomegaly

 

 

 

 Andrew Canada MD on October 28, 2017 at 14:40           

Board Certified Radiologist.

 This report was verified electronically.

## 2017-10-28 NOTE — HHI.HP
__________________________________________________





HPI


Service


[facility]


Primary Care Physician


Unknown


Admission Diagnosis





anasarca,pleural effusion, liver failure, fever


Diagnoses:  


Chief Complaint:  


Abdominal swelling


Abdominal pain


Bilateral lower extremity swelling


Travel History


International Travel<30 Days:  No


Contact w/Intl Traveler <30 Da:  No


Traveled to Known Affected Are:  No





Sepsis Criteria


SIRS Criteria (2 or more):  Temp > 100.9 or < 96.8, RR  > 20 or PaCO2 < 32


History of Present Illness


Written by Shraddha Foreman, acting as scribe for Dr. Limon on 10/28/17 at 17:

47. 





This is a 56-year-old male with a past medical history significant for alcohol 

abuse, hepatitis C untreated, hypertension and hypothyroidism who presents to 

[facility] ED with complaints of progressive swelling in his abdomen for 

the past 4 weeks.  Patient admits to history of heavy alcohol use and reports 

his last drink was 4-5 days after Labor Day.  Since that time, patient states 

she's been experiencing increased swelling in his abdomen as well as in his 

lower extremities.  About 2-3 weeks ago he began having progressive abdominal 

pain.  His daughter who is at the bedside states that she began to notice his 

eyes turning yellow around Father's Day of this year.  He is having a difficult 

time breathing.  He has begun to have some swelling in his scrotum.  He 

endorses being more tired and sleeping most of the day.  Patient has his 

initial GI appointment scheduled for this Monday and was to begin the process 

to obtain approval to begin hepatitis C treatment.  Patient denies any fever or 

chills.  Patient denies any complaints of nausea or vomiting.  He reports she's 

had a good appetite.  He denies any diarrhea or constipation.  In the ED, 

patient is febrile with temperature 102.8.  White count is within normal 

limits.  LFTs are elevated.  Platelet count is 105.


INR is 1.5.  Chest x-ray shows bilateral pleural effusions right greater than 

left and a hazy opacity greatest in the right lung.  CT of the abdomen and 

pelvis shows bilateral pleural effusions and extensive ascites as well as 

nodular contour to the liver and splenomegaly suggestive of possible portal 

venous hypertension.





Review of Systems


Except as stated in HPI:  all other systems reviewed are Neg





Past Family Social History


Past Medical History


Hypertension


Hypothyroidism


Alcohol abuse


Hepatitis C, untreated


Hx of GIB


Past Surgical History


Ganglion cyst


Tonsillectomy


Reported Medications


Levothyroxine (Levothyroxine Sodium) 50 Mcg Tab 50 Mcg PO DAILY


Protonix (Pantoprazole Sodium) 40 Mg Tabdr 40 Mg PO DAILY


Levothroid (Levothyroxine Sodium) 75 Mcg Tab 75 Mcg PO DAILY


Allergies:  


Coded Allergies:  


     No Known Allergies (Unverified , 12)


Active Ordered Medications





Current Medications








 Medications


  (Trade)  Dose


 Ordered  Sig/Mariana


 Route  Start Time


 Stop Time Status Last Admin


 


  (NS Flush)  2 ml  UNSCH  PRN


 IV FLUSH  10/28/17 14:15


     


 








Family History


Hypertension, CLL, COPD, CHF


Social History


History of previous tobacco use age [age]yr, 1 pack a day.  He admits to 

history of heavy alcohol abuse but states his last drink was over a month ago 

right after Labor Day.  Patient has a history of IV drug use but has not used 

in 25 years.





Physical Exam


Vital Signs





Vital Signs








  Date Time  Temp Pulse Resp B/P (MAP) Pulse Ox O2 Delivery O2 Flow Rate FiO2


 


10/28/17 17:03 99.7 81 18 136/84 (101) 98 Nasal Cannula 2.00 


 


10/28/17 15:18 102.8 81 18 148/88 (108) 96 Nasal Cannula 2.00 


 


10/28/17 14:18   18     


 


10/28/17 14:18   18  97 Nasal Cannula 2.00 


 


10/28/17 13:35 99.4 88 22 186/102 (130) 93   








Physical Exam


GENERAL: This is a ill appearing male in mild distress due to abdominal girth.  

Patient is awake and alert.  Daughters at the bedside.


SKIN: Jaundice.  Warm and dry.


HEAD: Atraumatic. Normocephalic. No temporal or scalp tenderness.


EYES: Pupils equal round and reactive. Extraocular motions intact. (+)scleral 

icterus. 


ENT: Nose without bleeding or purulent drainage. Throat without erythema, 

tonsillar hypertrophy or exudate. Uvula midline. Airway patent.


NECK: Trachea midline. No lymphadenopathy. Supple, nontender, no meningeal 

signs.


CARDIOVASCULAR: Regular rate and rhythm without murmurs, gallops, or rubs. 


RESPIRATORY: Diminished breath sounds throughout.


GASTROINTESTINAL: Limited exam.  Abdomen is tense.  (+)Ascites.  Diffusely 

tender to palpation.  Unable to assess for organomegaly.


MUSCULOSKELETAL: Extremities without clubbing or cyanosis. 2-3+ edema BLE.  No 

joint tenderness, effusion, or edema noted. No calf tenderness. 


NEUROLOGICAL: Awake and alert.  Able to move all extremities.  Normal speech.


Laboratory





Laboratory Tests








Test


  10/28/17


14:37 10/28/17


15:10 10/28/17


15:41


 


White Blood Count 6.0   


 


Red Blood Count 4.38   


 


Hemoglobin 15.1   


 


Hematocrit 42.9   


 


Mean Corpuscular Volume 98.0   


 


Mean Corpuscular Hemoglobin 34.4   


 


Mean Corpuscular Hemoglobin


Concent 35.1 


  


  


 


 


Red Cell Distribution Width 19.1   


 


Platelet Count 105   


 


Mean Platelet Volume 9.2   


 


Neutrophils (%) (Auto) 63.0   


 


Lymphocytes (%) (Auto) 18.7   


 


Monocytes (%) (Auto) 14.7   


 


Eosinophils (%) (Auto) 2.9   


 


Basophils (%) (Auto) 0.7   


 


Neutrophils # (Auto) 3.8   


 


Lymphocytes # (Auto) 1.1   


 


Monocytes # (Auto) 0.9   


 


Eosinophils # (Auto) 0.2   


 


Basophils # (Auto) 0.0   


 


CBC Comment DIFF FINAL   


 


Differential Comment    


 


Prothrombin Time 16.7   


 


Prothromb Time International


Ratio 1.5 


  


  


 


 


Activated Partial


Thromboplast Time 27.7 


  


  


 


 


Urine Color  YELLOW  


 


Urine Turbidity  HAZY  


 


Urine pH  6.0  


 


Urine Specific Gravity  1.012  


 


Urine Protein  NEG  


 


Urine Glucose (UA)  NEG  


 


Urine Ketones  NEG  


 


Urine Occult Blood  NEG  


 


Urine Nitrite  NEG  


 


Urine Bilirubin  NEG  


 


Urine Urobilinogen  2.0  


 


Urine Leukocyte Esterase  NEG  


 


Urine WBC  2  


 


Urine Mucus  FEW  


 


Microscopic Urinalysis Comment


  


  CULT NOT


INDICATED 


 


 


Blood Urea Nitrogen   20 


 


Creatinine   0.97 


 


Random Glucose   111 


 


Total Protein   7.7 


 


Albumin   2.2 


 


Calcium Level   7.7 


 


Alkaline Phosphatase   154 


 


Aspartate Amino Transf


(AST/SGOT) 


  


  149 


 


 


Alanine Aminotransferase


(ALT/SGPT) 


  


  67 


 


 


Total Bilirubin   6.6 


 


Sodium Level   136 


 


Potassium Level   4.2 


 


Chloride Level   105 


 


Carbon Dioxide Level   25.1 


 


Anion Gap   6 


 


Estimat Glomerular Filtration


Rate 


  


  79 


 


 


Gamma Glutamyl Transpeptidase   54 














 Date/Time


Source Procedure


Growth Status


 


 


 10/28/17 15:10


Blood Peripheral Aerobic Blood Culture


Pending Received


 


 10/28/17 15:10


Blood Peripheral Anaerobic Blood Culture


Pending Received








Result Diagram:  


10/28/17 1437                                                                  

              10/28/17 1541





Imaging





Last Impressions








Chest X-Ray 10/28/17 0000 Signed





Impressions: 





 Service Date/Time:  2017 14:27 - CONCLUSION:  1. 

Bilateral 





 pleural effusions right greater than left. 2. Hazy opacity greatest in the 

right 





 lung. 3. Mild cardiomegaly     Andrew Canada MD 


 


Abdomen/Pelvis CT 10/28/17 0000 Signed





Impressions: 





 Service Date/Time:  2017 16:11 - CONCLUSION:  1. 

Bilateral 





 pleural effusions and extensive ascites 2. Nodular contour to the liver and 





 splenomegaly suggests possible portal venous hypertension     Cl Howell MD 











Caprini VTE Risk Assessment


Caprini VTE Risk Assessment:  No/Low Risk (score <= 1)


VTE Pharm Contraindication:  Coagulopathy,INR elevated


Caprini Risk Assessment Model











 Point Value = 1          Point Value = 2  Point Value = 3  Point Value = 5


 


Age 41-60


Minor surgery


BMI > 25 kg/m2


Swollen legs


Varicose veins


Pregnancy or postpartum


History of unexplained or recurrent


   spontaneous 


Oral contraceptives or hormone


   replacement


Sepsis (< 1 month)


Serious lung disease, including


   pneumonia (< 1 month)


Abnormal pulmonary function


Acute myocardial infarction


Congestive heart failure (< 1 month)


History of inflammatory bowel disease


Medical patient at bed rest Age 61-74


Arthroscopic surgery


Major open surgery (> 45 min)


Laparoscopic surgery (> 45 min)


Malignancy


Confined to bed (> 72 hours)


Immobilizing plaster cast


Central venous access Age >= 75


History of VTE


Family history of VTE


Factor V Leiden


Prothrombin 41527R


Lupus anticoagulant


Anticardiolipin antibodies


Elevated serum homocysteine


Heparin-induced thrombocytopenia


Other congenital or acquired


   thrombophilia Stroke (< 1 month)


Elective arthroplasty


Hip, pelvis, or leg fracture


Acute spinal cord injury (< 1 month)








Prophylaxis Regimen











   Total Risk


Factor Score Risk Level Prophylaxis Regimen


 


0-1      Low Early ambulation


 


2 Moderate Order ONE of the following:


*Sequential Compression Device (SCD)


*Heparin 5000 units SQ BID


 


3-4 Higher Order ONE of the following medications:


*Heparin 5000 units SQ TID


*Enoxaparin/Lovenox 40 mg SQ daily (WT < 150 kg, CrCl > 30 mL/min)


*Enoxaparin/Lovenox 30 mg SQ daily (WT < 150 kg, CrCl > 10-29 mL/min)


*Enoxaparin/Lovenox 30 mg SQ BID (WT < 150 kg, CrCl > 30 mL/min)


AND/OR


*Sequential Compression Device (SCD)


 


5 or more Highest Order ONE of the following medications:


*Heparin 5000 units SQ TID (Preferred with Epidurals)


*Enoxaparin/Lovenox 40 mg SQ daily (WT < 150 kg, CrCl > 30 mL/min)


*Enoxaparin/Lovenox 30 mg SQ daily (WT < 150 kg, CrCl > 10-29 mL/min)


*Enoxaparin/Lovenox 30 mg SQ BID (WT < 150 kg, CrCl > 30 mL/min)


AND


*Sequential Compression Device (SCD)











Assessment and Plan


Assessment and Plan


56-year-old male with a past medical history significant for alcohol abuse, 

hepatitis C untreated, hypertension and hypothyroidism who presents to [facility] ED with complaints of progressive swelling in his abdomen for the past 4 

weeks.





SIRS, patient meets SIRS criteria with temperature 102.8 and elevated RR 22.


   - Patient given a dose of Zosyn in the ED


   - Continue IV antibiotic.  Concern for SBP.


   - Supportive care


   - Continuous cardiac monitoring


   - Follow up on blood culture results





Liver failure, likely secondary to cirrhosis 


Anasarca


History of hepatitis C, untreated


History of alcohol abuse


Transaminitis


   - CT of the abdomen and pelvis personally reviewed revealing bilateral 

pleural effusions, extensive ascites, nodular contour of the liver and 

splenomegaly suggestive of possible portal venous hypertension


   - Ultrasound-guided abdominal paracentesis.  Send specimen for fluid studies.


   - Consult GI


   - Patient given dose of IV furosemide in the ED.  Continue Lasix 40mg IV 

BID.  Monitor electrolytes.


   - Spironolactone 25 mg by mouth daily


   - strict I&Os


   - monitor UOP.  Maintain negative fluid status.


   - monitor LFTs


   - Discussed with patient importance of complete alcohol cessation





Dyspnea secondary to pleural effusions


   - Chest x-ray personally reviewed showing bilateral pleural effusions right 

greater than left and hazy opacity greatest in the right lung.


   - Obtain 2-D echo to assess for right-sided heart failure


   - IV diuresis and paracentesis


   - Will continue to monitor.  If dyspnea worsens, patient may require 

thoracentesis.  Will plan on repeat chest x-ray in a few days.





Hypertension


   - Uncontrolled at presentation but improved


   - Monitor





Coagulopathy


Thrombocytopenia


   - Likely secondary to liver disease


   - monitor for any s/sxs of bleeding


   - monitor as indicated





Hypothyroidism


   - Continue patient on home dose of levothyroxine 50 g daily





GI prophylaxis


   - PPI





DVT prophylaxis


   - Avoid chemical prophylaxis given coagulopathy


   - Bilateral SCD/MARY hose





This note was transcribed by gisela [Shraddha Foreman].  I, Dr. Francis Limon 

personally performed the history, physical exam, and medical decision making; 

and confirmed the accuracy of the information in the transcribed note.





Authenticated by Dr. Francis Limon on 10/28/17 at 1755.


Discussed Condition With


Patient, daughter, ED physician





Physician Certification


2 Midnight Certification Type:  Admission for Inpatient Services


Order for Inpatient Services


The services are ordered in accordance with Medicare regulations or non-

Medicare payer requirements, as applicable.  In the case of services not 

specified as inpatient-only, they are appropriately provided as inpatient 

services in accordance with the 2-midnight benchmark.


Estimated LOS (days):  3


3 days is the estimated time the patient will need to remain in the hospital, 

assuming treatment plan goals are met and no additional complications.


Post-Hospital Plan:  Not yet determined











Shraddha Foreman Oct 28, 2017 17:59


Francis Limon MD Oct 28, 2017 23:01

## 2017-10-29 NOTE — PD.CONS
HPI


History of Present Illness


This is a 56 year old male who presented to the ED with c/o progressive 

swelling in his abdomen over the past 4 weeks. Patient also reports increased 

swelling in his lower extremities and his scrotum. PMH significant for alcohol 

abuse, hepatitis C untreated, hypertension, and hypothyroidism. Patient states 

he was diagnosed with Hepatitis C about 2 months ago by his PCP. He has not 

seen GI for this, but does state he had his initial GI appointment scheduled 

for this Monday. Patient does report history of heavy alcohol use. States he 

last drank alcohol around Labor Day.





PFSH


Past Medical History


Hypertension


Hypothyroidism


Alcohol abuse


Hepatitis C, untreated


Hx of GIB


Past Surgical History


Ganglion cyst


Tonsillectomy


Coded Allergies:  


     No Known Allergies (Unverified , 6/13/12)


Medications





Current Medications








 Medications


  (Trade)  Dose


 Ordered  Sig/Mariana


 Route


 PRN Reason  Start Time


 Stop Time Status Last Admin


Dose Admin


 


 Sodium Chloride


  (NS Flush)  2 ml  UNSCH  PRN


 IV FLUSH


 FLUSH AFTER USING IV ACCESS  10/28/17 17:45


    10/28/17 23:51


 


 


 Sodium Chloride


  (NS Flush)  2 ml  BID


 IV FLUSH


   10/28/17 21:00


    10/29/17 09:13


 


 


 Ondansetron HCl


  (Zofran Inj)  4 mg  Q6H  PRN


 IVP


 NAUSEA OR VOMITING  10/28/17 17:45


     


 


 


 Naloxone HCl


  (Narcan Inj)  0.4 mg  UNSCH  PRN


 IV PUSH


 SEE LABEL COMMENTS  10/28/17 17:45


     


 


 


 Piperacillin Sod/


 Tazobactam Sod  50 ml @ 


 100 mls/hr  Q8H


 IV


   10/28/17 23:00


    10/29/17 15:29


 


 


 Lactulose


  (Lactulose Liq)  30 ml  BID


 PO


   10/28/17 21:00


    10/29/17 09:12


 


 


 Spironolactone


  (Aldactone)  25 mg  DAILY


 PO


   10/29/17 09:00


    10/29/17 09:12


 


 


 Ibuprofen


  (Motrin)  400 mg  Q6HR  PRN


 PO


 fever  10/28/17 23:30


    10/28/17 23:51


 


 


 Furosemide


  (Lasix Inj)  40 mg  BID@0700,1500


 IV PUSH


   10/29/17 15:00


    10/29/17 15:30


 








Family History


Non contributory


Social History


Tobacco, non currently, but reports he smoked 1 PPD from age 14 years to 24 

years old


ETOH, admits to heavy alcohol use. States last alcohol use was around Labor Day


Illicit Drugs, history of IV drug use, none in past 25 years





Review of Systems


Constitutional:  DENIES: Diaphoretic episodes, Fatigue, Fever, Weight gain, 

Weight loss, Chills, Dizziness, Change in appetite, Night Sweats


Endocrine:  DENIES: Polydipsia, Polyuria


Eyes:  DENIES: Blurred vision, Photosensitivity, Double Vision


Ears, nose, mouth, throat:  DENIES: Hearing loss, Vertigo, Oral lesions, Throat 

pain, Hoarseness


Respiratory:  DENIES: Cough, Wheezing, Hemoptysis, Sputum production, Shortness 

of breath


Cardiovascular:  COMPLAINS OF: Lower Extremity Edema, DENIES: Chest pain, 

Palpitations, Syncope, Orthopnea, Claudication


Gastrointestinal:  COMPLAINS OF: Abdominal pain, Swelling of Abdomen, DENIES: 

Black stools, Bloody stools, Constipation, Diarrhea, Nausea, Vomiting, 

Difficulty Swallowing, Anorexia, Odynophagia, Heartburn, Hematemesis


Genitourinary:  DENIES: Urinary frequency, Urinary incontinence, Urgency, 

Hematuria, Dysuria, Nocturia


Musculoskeletal:  DENIES: Joint pain, Muscle aches, Stiffness, Joint Swelling, 

Back pain, Neck pain


Integumentary:  DENIES: Abnormal pigmentation, Nail changes, Pruritus, Rash, 

Jaundice


Hematologic/lymphatic:  DENIES: Bruising, Lymphadenopathy


Immunologic/allergic:  DENIES: Eczema, Urticaria


Neurologic:  DENIES: Abnormal gait, Headache, Localized weakness, Paresthesias


Psychiatric:  DENIES: Anxiety, Confusion, Mood changes, Depression, Agitation, 

Suicidal Ideation





GI Exam


Vitals I&O





Vital Signs








  Date Time  Temp Pulse Resp B/P (MAP) Pulse Ox O2 Delivery O2 Flow Rate FiO2


 


10/29/17 12:00 96.4 66 17 107/67 (80) 93   


 


10/29/17 08:00 96.2 64 16 97/60 (72) 93   


 


10/29/17 04:00 95.4 63 20 100/61 (74) 94   


 


10/29/17 00:00 102.4 93 20 124/67 (86) 93   


 


10/28/17 20:00 98.8 84 20 115/56 (75) 94   


 


10/28/17 17:03 99.7 81 18 136/84 (101) 98 Nasal Cannula 2.00 














I/O      


 


 10/28/17 10/28/17 10/28/17 10/29/17 10/29/17 10/29/17





 07:00 15:00 23:00 07:00 15:00 23:00


 


Intake Total   240 ml 240 ml  


 


Balance   240 ml 240 ml  


 


      


 


Intake Oral   240 ml 240 ml  


 


# Voids   0 3  








Imaging





Last Impressions








Chest X-Ray 10/28/17 0000 Signed





Impressions: 





 Service Date/Time:  Saturday, October 28, 2017 14:27 - CONCLUSION:  1. 

Bilateral 





 pleural effusions right greater than left. 2. Hazy opacity greatest in the 

right 





 lung. 3. Mild cardiomegaly     Andrew Canada MD 


 


Abdomen/Pelvis CT 10/28/17 0000 Signed





Impressions: 





 Service Date/Time:  Saturday, October 28, 2017 16:11 - CONCLUSION:  1. 

Bilateral 





 pleural effusions and extensive ascites 2. Nodular contour to the liver and 





 splenomegaly suggests possible portal venous hypertension     Cl Howell MD 








Laboratory











Test


  10/29/17


05:56 10/29/17


12:20


 


White Blood Count 8.7 TH/MM3  


 


Red Blood Count 3.87 MIL/MM3  


 


Hemoglobin 13.2 GM/DL  


 


Hematocrit 38.3 %  


 


Mean Corpuscular Volume 98.9 FL  


 


Mean Corpuscular Hemoglobin 34.2 PG  


 


Mean Corpuscular Hemoglobin


Concent 34.6 % 


  


 


 


Red Cell Distribution Width 19.2 %  


 


Platelet Count 47 TH/MM3  


 


Mean Platelet Volume 9.6 FL  


 


Neutrophils (%) (Auto) 66.5 %  


 


Lymphocytes (%) (Auto) 18.4 %  


 


Monocytes (%) (Auto) 13.9 %  


 


Eosinophils (%) (Auto) 0.9 %  


 


Basophils (%) (Auto) 0.3 %  


 


Neutrophils # (Auto) 5.8 TH/MM3  


 


Lymphocytes # (Auto) 1.6 TH/MM3  


 


Monocytes # (Auto) 1.2 TH/MM3  


 


Eosinophils # (Auto) 0.1 TH/MM3  


 


Basophils # (Auto) 0.0 TH/MM3  


 


CBC Comment AUTO DIFF  


 


Differential Comment


  AUTO DIFF


CONFIRMED 


 


 


Platelet Estimate LOW  


 


Platelet Morphology Comment NORMAL  


 


Prothrombin Time 20.2 SEC  


 


Prothromb Time International


Ratio 1.8 RATIO 


  


 


 


Blood Urea Nitrogen 25 MG/DL  


 


Creatinine 1.28 MG/DL  


 


Random Glucose 96 MG/DL  


 


Total Protein 6.5 GM/DL  


 


Albumin 1.9 GM/DL  


 


Calcium Level 7.8 MG/DL  


 


Alkaline Phosphatase 96 U/L  


 


Aspartate Amino Transf


(AST/SGOT) 110 U/L 


  


 


 


Alanine Aminotransferase


(ALT/SGPT) 54 U/L 


  


 


 


Lactate Dehydrogenase 325 U/L  


 


Total Bilirubin 7.0 MG/DL  


 


Sodium Level 137 MEQ/L  


 


Potassium Level 3.2 MEQ/L  


 


Chloride Level 102 MEQ/L  


 


Carbon Dioxide Level 26.0 MEQ/L  


 


Anion Gap 9 MEQ/L  


 


Estimat Glomerular Filtration


Rate 58 ML/MIN 


  


 


 


Peritoneal Fluid WBC  2562 /MM3 


 


Peritoneal Fluid RBC  332 /MM3 


 


Peritoneal Fluid Neutrophils  79 % 


 


Peritoneal Fluid Lymphocytes  6 % 


 


Peritoneal Fluid Monocytes  7 % 


 


Peritoneal Fluid Histiocytes  2 % 


 


Peritoneal Fluid Mesothelial


Cells 


  6 % 


 


 


Peritoneal Fluid Comment   


 


Peritoneal Fluid Total Protein  0.8 GM/DL 


 


Peritoneal Fluid Albumin  0.3 G/DL 


 


Peritoneal Fluid LDH  57 U/L 


 


Peritoneal Fluid Glucose  106 MG/DL 














 Date/Time


Source Procedure


Growth Status


 


 


 10/28/17 15:10


Blood Peripheral Aerobic Blood Culture - Preliminary


NO GROWTH IN 1 DAY Resulted


 


 10/28/17 15:10


Blood Peripheral Anaerobic Blood Culture - Preliminary


NO GROWTH IN 1 DAY Resulted





 10/29/17 12:20


Fluid Peritoneal Fluid Gram Stain


Pending Received


 


 10/29/17 12:20


Fluid Peritoneal Fluid Body Fluid Culture


Pending Received








Physical Examination


HEENT: Normocephalic. + scleral icterus


NECK: Neck is supple.


CHEST:  CTA. Diminished breath sounds throughout 


CARDIAC:  RRR with no murmur gallop or rubs.


ABDOMEN: Abdomen distended. + Ascites. Diffuse TTP. Bowel sounds active x 4.


EXTREMITIES: 2-3+ edema at BLE


SKIN:  Jaundice. 


CNS:  No focal deficits; alert and oriented times three.





Assessment and Plan


Plan


ASSESSMENT:


- Hepatitis C, recent diagnosis. Has not seen GI for this, but does state he 

had an appointment scheduled for Monday with GI


  as initial visit and to get start process to get approval for Hep C 

treatment. Patient has noted progressive swelling at abdomen 


  and lower extremities over the past 4 weeks. + alcohol abuse (last alcohol 

around Labor Day) and history of IV drug use (has not 


  used in past 25 years). Abdomen CT (10/28/17)--1. Bilateral pleural effusions 

and extensive ascites 2. Nodular contour to the liver and 


  splenomegaly suggests possible portal venous hypertension. Patient is s/p 

paracentesis today. Peritoneal fluid WBC 2562, . 


  Peritoneal fluid culture pending. Fever high of 102.4F today. WBC normal. 

Blood culture, no growth in 1 day. On Zosyn 


- Thrombocytopenia. Platelets went from 105 (10/28) to 47 (10/29). No signs of 

active bleeding. HH stable 13.2/38.3


- Bilateral pleural effusions, dyspnea. CT as above. Shortness of breath 

improved s/p paracentesis. 


- Hypertension, hypothyroidism





PLAN:


- Hepatitis panel


- Hepatitis C genotype and PCR


- Continue diuretics 


- Continue Zosyn 


- Monitor LFTs


- Monitor CBC


- Monitor labs


- Supportive care


- Further recommendations to follow based on results of above





Patient seen and examined by Dr. Victor and myself and this note is written 

on his behalf.











Pallavi Juárez Oct 29, 2017 16:35

## 2017-10-29 NOTE — HHI.PR
Subjective


Remarks


Status post paracentesis today.  Patient is feeling better after the procedure.

  He says he is less short of breath.  No new complaints.  Lower extremity 

edema is still prominent and ascends to approximately level of the umbilicus.





Objective





Vital Signs








  Date Time  Temp Pulse Resp B/P (MAP) Pulse Ox O2 Delivery O2 Flow Rate FiO2


 


10/29/17 12:00 96.4 66 17 107/67 (80) 93   


 


10/29/17 08:00 96.2 64 16 97/60 (72) 93   


 


10/29/17 04:00 95.4 63 20 100/61 (74) 94   


 


10/29/17 00:00 102.4 93 20 124/67 (86) 93   


 


10/28/17 20:00 98.8 84 20 115/56 (75) 94   


 


10/28/17 17:03 99.7 81 18 136/84 (101) 98 Nasal Cannula 2.00 


 


10/28/17 15:18 102.8 81 18 148/88 (108) 96 Nasal Cannula 2.00 


 


10/28/17 14:18   18     


 


10/28/17 14:18   18  97 Nasal Cannula 2.00 














I/O      


 


 10/28/17 10/28/17 10/28/17 10/29/17 10/29/17 10/29/17





 07:00 15:00 23:00 07:00 15:00 23:00


 


Intake Total   240 ml 240 ml  


 


Balance   240 ml 240 ml  


 


      


 


Intake Oral   240 ml 240 ml  


 


# Voids   0 3  








Result Diagram:  


10/29/17 0556                                                                  

              10/29/17 0556





Objective Remarks


GENERAL: NAD, A&Ox3


HEAD: Normocephalic. 


NECK: Supple, trachea midline. No lymphadenopathy.


EYES: No scleral icterus. No injection or drainage. 


CARDIOVASCULAR: Regular rate and rhythm without murmurs, gallops, or rubs. 


RESPIRATORY: Breath sounds equal bilaterally. No accessory muscle use.


GASTROINTESTINAL: Abdomen soft, non-tender, nondistended. 


MUSCULOSKELETAL: No cyanosis.  Pitting edema to level of umbilicus.


SKIN: Warm and dry.


NEURO:  No focal neurological deficitis.





A/P


Problem List:  


(1) Acute liver failure


ICD Code:  K72.00 - Acute and subacute hepatic failure without coma


Status:  Acute


(2) Anasarca


ICD Code:  R60.1 - Generalized edema


Status:  Acute


(3) Pleural effusion associated with hepatic disorder


ICD Code:  K76.9 - Liver disease, unspecified; J91.8 - Pleural effusion in 

other conditions classified elsewhere


Status:  Acute


Assessment and Plan





Assessment and plan


56-year-old male admitted secondary to anasarca and bilateral pleural effusions

, with dyspnea.





Anasarca


Cirrhosis


History of alcohol abuse


Hepatitis C


Liver failure


Transaminitis


Status post paracentesis today


Follow electrolytes


Continue diuresis with Lasix and spironolactone


GI following


Follow LFTs





Lower extremity edema


Related to anasarca


Start MARY hose and SCDs


Continue diuretics





Bilateral pleural effusions


Dyspnea


Dyspnea improved status post paracentesis 


Monitor for any recurrence of shortness of breath


Consider thoracentesis based on symptoms


Continue diuresis





SIRS


Fevers resolving


Follow paracentesis cultures


No change to antibiotics (Zosyn)





Hypertension


Follow blood pressures


Blood pressures are stable status post thoracentesis





Coagulopathy


Thrombocytopenia


Decline in platelets overnight 


Continue to monitor platelets 


Related to liver disease 


Follow for signs of bleeding


Follow CBC





Hypothyroidism


Continue levothyroxin


Follows in outpatient





DVT prophylaxis


SCDs and MARY hose


No anticoagulants currently due to bleeding risk





Problem Qualifiers





(1) Acute liver failure:  


Qualified Codes:  K72.00 - Acute and subacute hepatic failure without coma








Roque Tuttle MD Oct 29, 2017 14:19

## 2017-10-30 NOTE — ECHRPT
Indication:   SHORTNESS OF BREATH

 

 CONCLUSIONS

 Normal left ventricular size and function. Estimated ejection fraction 50%

 Wall thickness is normal. 

 A large left sided pleural effusion is noted. 

 

 BP:  123   / 70      HR:                          Rhythm:

 

 MEASUREMENTS  (Male / Female) Normal Values       Technical Quality:

 

 DOPPLER

 AV Peak Velocity                  203.0 cm/s            

 AV Peak Gradient                  16.5 mmHg             

 AV Mean Gradient                  9.0 mmHg              

 AV Velocity Time Integral         32.0 cm               

 LVOT Peak Velocity                135.0 cm/s            

 LVOT Peak Gradient                7.3 mmHg              

 LVOT Velocity Time Integral       17.9 cm               

 Mitral E Point Velocity           49.9 cm/s             

 Mitral A Point Velocity           72.6 cm/s             

 Mitral E to A Ratio               0.7                   

 LV E' Lateral Velocity            16.4 cm/s             

 Mitral E to LV E' Lateral Ratio   3.0                   

 LV E' Septal Velocity             6.8 cm/s              

 Mitral E to LV E' Septal Ratio    7.3                   

 TR Peak Velocity                  294.0 cm/s            

 TR Peak Gradient                  34.6 mmHg             

 Right Atrial Pressure             10.0 mmHg             

 Pulmonary Artery Systolic Pressu  44.6 mmHg             

 Right Ventricular Systolic Press  44.6 mmHg             

 

 

 FINDINGS

 

 LEFT VENTRICLE

 Normal left ventricular size. 

 Wall thickness is normal. 

 The left ventricular systolic function is normal with an estimated ejection fraction in the range of
 60-65%. 

 

 

 

 

 

 PERICARDIUM

 A large left sided pleural effusion is noted. 

 

 OTHER FINDINGS

 Patient is a liver failure patient. Has a huge belly with acites. No images except for apicals

 

 

 

 

  Familia León MD

  (Electronically Signed)

  Final Date:30 October 2017 17:34

## 2017-10-30 NOTE — RADRPT
EXAM DATE/TIME:  10/29/2017 10:06 

 

[facility] COMPARISON:     

CT ABDOMEN & PELVIS W/O CONTRAST, October 28, 2017, 16:11.

 

 

INDICATIONS :      

Ascites. 

                     

                     

 

 

 

MEDICAL HISTORY :        

Thyroid disease. ETOH abuse. 

 

SURGICAL HISTORY :     

Tonsillectomy.   Ganglion cyst in left wrist removed. 

 

ENCOUNTER:     

Initial

 

ACUITY:     

1 day

 

PAIN SCORE:     

3/10

 

LOCATION:     

Right lower quadrant

                     

 

FLUID:

Total volume of 5200 cc of clear, yellow fluid was removed.

Fluid was sent to lab for ordered studies. 

Post procedure scanning reveals no hematoma or other complication.

 

 

TECHNIQUE:  

1.  Ultrasound guidance for abdominal paracentesis.

2.  Paracentesis.

 

The risks, benefits, and alternatives to ultrasound guided paracentesis were explained to the patient
 in detail including the risk of bleeding and infection.  Written and verbal informed consent was obt
ained.  

 

With the patient on the ultrasound table, ultrasound imaging was used to select the most appropriate 
approach for paracentesis.  Overlying skin was prepped and draped in the usual sterile fashion and wi
th a local anesthetic, a dermatotomy was made with an 11 blade scalpel.  A 6 Kazakh Kyu-B-elaoktew ca
theter was introduced into the peritoneal cavity and fluid was collected.   

 

The patient tolerated the procedure well and left the ultrasound suite in stable condition. 

 

CONCLUSION:     

Uncomplicated ultrasound guided paracentesis.  

 

 

 

 Ryan Byrd MD on October 30, 2017 at 9:15           

Board Certified Radiologist.

 This report was verified electronically.

## 2017-10-30 NOTE — RADRPT
EXAM DATE/TIME:  10/29/2017 10:06 

 

[facility] COMPARISON:     

CT ABDOMEN & PELVIS W/O CONTRAST, October 28, 2017, 16:11.

 

 

INDICATIONS :      

Ascites. 

                     

                     

 

 

 

MEDICAL HISTORY :        

Thyroid disease. ETOH abuse. 

 

SURGICAL HISTORY :     

Tonsillectomy.   Ganglion cyst in left wrist removed. 

 

ENCOUNTER:     

Initial

 

ACUITY:     

1 day

 

PAIN SCORE:     

3/10

 

LOCATION:     

Right lower quadrant

                     

 

FLUID:

Total volume of 5200 cc of clear, yellow fluid was removed.

Fluid was sent to lab for ordered studies. 

Post procedure scanning reveals no hematoma or other complication.

 

 

TECHNIQUE:  

1.  Ultrasound guidance for abdominal paracentesis.

2.  Paracentesis.

 

The risks, benefits, and alternatives to ultrasound guided paracentesis were explained to the patient
 in detail including the risk of bleeding and infection.  Written and verbal informed consent was obt
ained.  

 

With the patient on the ultrasound table, ultrasound imaging was used to select the most appropriate 
approach for paracentesis.  Overlying skin was prepped and draped in the usual sterile fashion and wi
th a local anesthetic, a dermatotomy was made with an 11 blade scalpel.  A 6 Kinyarwanda Uhz-A-[id number] ca
theter was introduced into the peritoneal cavity and fluid was collected.   

 

The patient tolerated the procedure well and left the ultrasound suite in stable condition. 

 

CONCLUSION:     

Uncomplicated ultrasound guided paracentesis.  

 

 

 

 Ryan Byrd MD on October 30, 2017 at 9:15           

Board Certified Radiologist.

 This report was verified electronically.

## 2017-10-30 NOTE — RADRPT
EXAM DATE/TIME:  10/29/2017 10:06 

 

[facility] COMPARISON:     

CT ABDOMEN & PELVIS W/O CONTRAST, October 28, 2017, 16:11.

 

 

INDICATIONS :      

Ascites. 

                     

                     

 

 

 

MEDICAL HISTORY :        

Thyroid disease. ETOH abuse. 

 

SURGICAL HISTORY :     

Tonsillectomy.   Ganglion cyst in left wrist removed. 

 

ENCOUNTER:     

Initial

 

ACUITY:     

1 day

 

PAIN SCORE:     

3/10

 

LOCATION:     

Right lower quadrant

                     

 

FLUID:

Total volume of 5200 cc of clear, yellow fluid was removed.

Fluid was sent to lab for ordered studies. 

Post procedure scanning reveals no hematoma or other complication.

 

 

TECHNIQUE:  

1.  Ultrasound guidance for abdominal paracentesis.

2.  Paracentesis.

 

The risks, benefits, and alternatives to ultrasound guided paracentesis were explained to the patient
 in detail including the risk of bleeding and infection.  Written and verbal informed consent was obt
ained.  

 

With the patient on the ultrasound table, ultrasound imaging was used to select the most appropriate 
approach for paracentesis.  Overlying skin was prepped and draped in the usual sterile fashion and wi
th a local anesthetic, a dermatotomy was made with an 11 blade scalpel.  A 6 Bengali Arz-D-tpipctzt ca
theter was introduced into the peritoneal cavity and fluid was collected.   

 

The patient tolerated the procedure well and left the ultrasound suite in stable condition. 

 

CONCLUSION:     

Uncomplicated ultrasound guided paracentesis.  

 

 

 

 Ryan Byrd MD on October 30, 2017 at 9:15           

Board Certified Radiologist.

 This report was verified electronically.

## 2017-10-30 NOTE — HHI.PR
Subjective


Remarks


Feeling frustrated because of his medical condition


Not in a good mood


Seems to be stable, no chest pain or short of breath





Objective


Vitals





Vital Signs








  Date Time  Temp Pulse Resp B/P (MAP) Pulse Ox O2 Delivery O2 Flow Rate FiO2


 


10/30/17 08:00 98.2 88 16 109/58 (75) 90   


 


10/30/17 04:00 99.1 91 20 123/70 (87) 93   


 


10/30/17 00:00 99.2 90 22 104/58 (73) 93   


 


10/29/17 21:27  80      


 


10/29/17 20:00 98.5 74 20 129/67 (87) 93   


 


10/29/17 16:00 96.8 72 18 115/66 (82) 94   














I/O      


 


 10/29/17 10/29/17 10/29/17 10/30/17 10/30/17 10/30/17





 07:00 15:00 23:00 07:00 15:00 23:00


 


Intake Total 240 ml  700 ml 320 ml  


 


Output Total   2 ml   


 


Balance 240 ml  698 ml 320 ml  


 


      


 


Intake Oral 240 ml  600 ml 320 ml  


 


IV Total   100 ml   


 


Output Urine Total   2 ml   


 


# Voids 3   2  


 


# Bowel Movements   2 0  








Result Diagram:  


10/30/17 1155                                                                  

              10/30/17 1155





Objective Remarks


GENERAL: This is a well-nourished, well-developed patient, in no apparent 

distress.


CARDIOVASCULAR: Regular rate and rhythm without murmurs, gallops, or rubs. 


RESPIRATORY: Fair entry. Breath sounds equal bilaterally. No wheezes, rales, or 

rhonchi.  


GASTROINTESTINAL: Abdomen soft, non-tender, mildly distended. Normal active 

bowel sounds


MUSCULOSKELETAL: Extremities without clubbing, cyanosis, or edema.


NEURO:  Alert & Oriented x4 to person, place, time, situation.  Moves all ext x4





A/P


Assessment and Plan


56-year-old male admitted secondary to anasarca and bilateral pleural effusions

, with dyspnea.





Anasarca


Cirrhosis


History of alcohol abuse


Hepatitis C


Liver failure


Transaminitis


Status post paracentesis 


Follow electrolytes


Continue diuresis with Lasix and spironolactone


GI following, increase Aldactone


Follow LFTs





Lower extremity edema


Related to anasarca


Start MARY hose and SCDs


Continue diuretics





Bilateral pleural effusions


Dyspnea


Dyspnea improved status post paracentesis 


Monitor for any recurrence of shortness of breath


Consider thoracentesis based on symptoms


Continue diuresis





SIRS


Fevers resolving


Follow paracentesis cultures


No change to antibiotics (Zosyn)





Hypertension


Follow blood pressures


Blood pressures are stable status post thoracentesis





Coagulopathy


Thrombocytopenia


Decline in platelets overnight 


Continue to monitor platelets 


Related to liver disease 


Follow for signs of bleeding


Follow CBC





Hypothyroidism


Continue levothyroxin


Follows in outpatient





DVT prophylaxis


SCDs and MARY hose


No anticoagulants currently due to bleeding risk











Lianne Hough MD Oct 30, 2017 13:31

## 2017-10-30 NOTE — HHI.GIFU
Subjective


Remarks


Resting in bed no distress.  Still with significant ascites and lower extremity 

edema.  Denies n/v, abdominal pain.  Has never previously been diagnosed with 

liver cirrhosis.





Objective


Vitals I&O





Vital Signs








  Date Time  Temp Pulse Resp B/P (MAP) Pulse Ox O2 Delivery O2 Flow Rate FiO2


 


10/30/17 04:00 99.1 91 20 123/70 (87) 93   


 


10/30/17 00:00 99.2 90 22 104/58 (73) 93   


 


10/29/17 21:27  80      


 


10/29/17 20:00 98.5 74 20 129/67 (87) 93   


 


10/29/17 16:00 96.8 72 18 115/66 (82) 94   


 


10/29/17 12:00 96.4 66 17 107/67 (80) 93   














I/O      


 


 10/29/17 10/29/17 10/29/17 10/30/17 10/30/17 10/30/17





 07:00 15:00 23:00 07:00 15:00 23:00


 


Intake Total 240 ml  700 ml 320 ml  


 


Output Total   2 ml   


 


Balance 240 ml  698 ml 320 ml  


 


      


 


Intake Oral 240 ml  600 ml 320 ml  


 


IV Total   100 ml   


 


Output Urine Total   2 ml   


 


# Voids 3   2  


 


# Bowel Movements   2 0  








Laboratory





Laboratory Tests








Test


  10/29/17


12:20 10/30/17


05:41 10/30/17


05:44


 


Peritoneal Fluid WBC 2562   


 


Peritoneal Fluid    


 


Peritoneal Fluid Neutrophils 79   


 


Peritoneal Fluid Lymphocytes 6   


 


Peritoneal Fluid Monocytes 7   


 


Peritoneal Fluid Histiocytes 2   


 


Peritoneal Fluid Mesothelial


Cells 6 


  


  


 


 


Peritoneal Fluid Comment    


 


Peritoneal Fluid Total Protein 0.8   


 


Peritoneal Fluid Albumin 0.3   


 


Peritoneal Fluid LDH 57   


 


Peritoneal Fluid Glucose 106   


 


White Blood Count  7.3  


 


Red Blood Count  3.97  


 


Hemoglobin  13.4  


 


Hematocrit  38.8  


 


Mean Corpuscular Volume  97.8  


 


Mean Corpuscular Hemoglobin  33.8  


 


Mean Corpuscular Hemoglobin


Concent 


  34.6 


  


 


 


Red Cell Distribution Width  19.0  


 


Platelet Count  47  


 


Mean Platelet Volume  9.1  


 


Neutrophils (%) (Auto)  54.1  


 


Lymphocytes (%) (Auto)  23.9  


 


Monocytes (%) (Auto)  17.6  


 


Eosinophils (%) (Auto)  3.3  


 


Basophils (%) (Auto)  1.1  


 


Neutrophils # (Auto)  3.9  


 


Lymphocytes # (Auto)  1.7  


 


Monocytes # (Auto)  1.3  


 


Eosinophils # (Auto)  0.2  


 


Basophils # (Auto)  0.1  


 


CBC Comment  AUTO DIFF  


 


Blood Urea Nitrogen   25 


 


Creatinine   1.26 


 


Random Glucose   102 


 


Total Protein   6.5 


 


Albumin   1.8 


 


Calcium Level   7.7 


 


Alkaline Phosphatase   100 


 


Aspartate Amino Transf


(AST/SGOT) 


  


  89 


 


 


Alanine Aminotransferase


(ALT/SGPT) 


  


  48 


 


 


Total Bilirubin   6.5 


 


Sodium Level   136 


 


Potassium Level   3.0 


 


Chloride Level   100 


 


Carbon Dioxide Level   28.1 


 


Anion Gap   8 


 


Estimat Glomerular Filtration


Rate 


  


  59 


 














 Date/Time


Source Procedure


Growth Status


 


 


 10/28/17 15:10


Blood Peripheral Aerobic Blood Culture - Preliminary


NO GROWTH IN 1 DAY Resulted


 


 10/28/17 15:10


Blood Peripheral Anaerobic Blood Culture - Preliminary


NO GROWTH IN 1 DAY Resulted





 10/29/17 12:20


Fluid Peritoneal Fluid Gram Stain


Pending Received


 


 10/29/17 12:20


Fluid Peritoneal Fluid Body Fluid Culture


Pending Received








Imaging





Last Impressions








Chest X-Ray 10/28/17 0000 Signed





Impressions: 





 Service Date/Time:  Saturday, October 28, 2017 14:27 - CONCLUSION:  1. 

Bilateral 





 pleural effusions right greater than left. 2. Hazy opacity greatest in the 

right 





 lung. 3. Mild cardiomegaly     Andrew Canada MD 


 


Abdomen/Pelvis CT 10/28/17 0000 Signed





Impressions: 





 Service Date/Time:  Saturday, October 28, 2017 16:11 - CONCLUSION:  1. 

Bilateral 





 pleural effusions and extensive ascites 2. Nodular contour to the liver and 





 splenomegaly suggests possible portal venous hypertension     Cl Howell MD 








Physical Exam


HEENT:Normocephalic; atraumatic; no jaundice 


CHEST:  CTA


CARDIAC:  RRR


ABDOMEN:  Soft, distended with ascites, nontender; no hepatosplenomegaly; bowel 

sounds are present in all four quadrants.


EXTREMITIES: 3-4+ ble edema.


SKIN:  Normal; no rash; no jaundice.


CNS:  No focal deficits; alert and oriented times three.





Assessment and Plan


Plan


ASSESSMENT:


- New onset ascites.  Worsening abdominal swelling/ble edema over the past 

month.  S/P Paracentesis (10/29).  Peritoneal fluid WBC 2562, 


   . Cx pending.  Lasix 40mg IV BID, Spironolactone 25mg po daily.  

Zosyn. 


- Liver cirrhosis, as evidenced by ascites, splenomegaly, coagulopathy/

thrombocytopenia, hypoalbuminemia, and nodular appearance


   of the liver on CT scan.  Abdomen CT (10/28/17)--1. Bilateral pleural 

effusions and extensive ascites 2. Nodular contour to the liver and 


  splenomegaly suggests possible portal venous hypertension.  


- Hepatitis C, recent diagnosis. Has not seen GI for this, but does state he 

had an appointment scheduled for Monday with GI


  as initial visit and to get start process to get approval for Hep C 

treatment.  Genotype/viral load pending. 


- Thrombocytopenia/Coagulopathy.  Plt 47,000, PT 20.2, INR 1.8.  


- Bilateral pleural effusions, dyspnea. CT as above. Shortness of breath 

improved s/p paracentesis. 


- Hypertension, hypothyroidism per attending. 





PLAN:


- 2 gram sodium diet- d/w patient importance of low sodium diet


- 1500cc fluid restriction


- Await peritoneal cx


- Await HCV Genotype and viral load


- Cont. Lasix 40mg IV BID


- Increase Aldactone to 100mg po daily


- Continue Zosyn 


- D/C Motrin


- Monitor labs


- Supportive care


- Further recommendations to follow based on results of above


- Patient seen and examined by Dr. Celeste and myself and this note is written on 

her behalf.











Georgie Narvaez Oct 30, 2017 08:55

## 2017-10-31 NOTE — HHI.GIFU
Subjective


Remarks


Pt resting in bed, daughter at bedside.  Not much change abd distention, lower 

leg edema.  +BM.  


 (Saundra Demarco)





Objective


Vitals I&O





Vital Signs








  Date Time  Temp Pulse Resp B/P (MAP) Pulse Ox O2 Delivery O2 Flow Rate FiO2


 


10/31/17 12:00 95.6 83 17 122/75 (91) 96   


 


10/31/17 08:00 96.3 84 17 113/72 (86) 93   


 


10/31/17 04:00 96.1 81 18 104/64 (77) 93   


 


10/31/17 00:00 98.6 82 18 115/73 (87) 92   


 


10/30/17 20:00 96.3 80 18 118/78 (91) 94   


 


10/30/17 16:00 97.5 81 17 127/78 (94) 92   














I/O      


 


 10/30/17 10/30/17 10/30/17 10/31/17 10/31/17 10/31/17





 07:00 15:00 23:00 07:00 15:00 23:00


 


Intake Total 320 ml  530 ml 100 ml  


 


Balance 320 ml  530 ml 100 ml  


 


      


 


Intake Oral 320 ml  480 ml   


 


IV Total   50 ml 100 ml  


 


# Voids 2  3   


 


# Bowel Movements 0  1   








Laboratory





Laboratory Tests








Test


  10/31/17


06:30


 


Blood Urea Nitrogen 24 


 


Creatinine 1.02 


 


Random Glucose 83 


 


Calcium Level 7.5 


 


Sodium Level 134 


 


Potassium Level 3.3 


 


Chloride Level 100 


 


Carbon Dioxide Level 27.1 


 


Anion Gap 7 


 


Estimat Glomerular Filtration


Rate 75 


 














 Date/Time


Source Procedure


Growth Status


 


 


 10/28/17 15:10


Blood Peripheral Aerobic Blood Culture - Preliminary


NO GROWTH IN 3 DAYS Resulted


 


 10/28/17 15:10


Blood Peripheral Anaerobic Blood Culture - Preliminary


NO GROWTH IN 3 DAYS Resulted





 10/29/17 12:20


Fluid Peritoneal Fluid Gram Stain - Final Resulted


 


 10/29/17 12:20


Fluid Peritoneal Fluid Body Fluid Culture - Preliminary


NO GROWTH IN 48 HOURS. Resulted








Imaging





Last Impressions








Cyst Biopsy Asp-Paracentesis US 10/29/17 0000 Signed





Impressions: 





 Service Date/Time:  Sunday, October 29, 2017 10:06 - CONCLUSION:  

Uncomplicated 





 ultrasound guided paracentesis.       Ryan Byrd MD 


 


Chest X-Ray 10/28/17 0000 Signed





Impressions: 





 Service Date/Time:  Saturday, October 28, 2017 14:27 - CONCLUSION:  1. 

Bilateral 





 pleural effusions right greater than left. 2. Hazy opacity greatest in the 

right 





 lung. 3. Mild cardiomegaly     Andrew Canada MD 


 


Abdomen/Pelvis CT 10/28/17 0000 Signed





Impressions: 





 Service Date/Time:  Saturday, October 28, 2017 16:11 - CONCLUSION:  1. 

Bilateral 





 pleural effusions and extensive ascites 2. Nodular contour to the liver and 





 splenomegaly suggests possible portal venous hypertension     Cl Howell MD 








Physical Exam


HEENT:Normocephalic; atraumatic; no jaundice 


CHEST:  CTA


CARDIAC:  RRR


ABDOMEN:  Soft, distended with ascites, nontender; no hepatosplenomegaly; bowel 

sounds are present in all four quadrants.


EXTREMITIES: significant BLE edema


SKIN:  Normal; no rash; no jaundice.


CNS:  No focal deficits; alert and oriented times three.


 (Saundra Demarco ARNWILNER)





Assessment and Plan


Plan


ASSESSMENT:


- New onset ascites.  Worsening abdominal swelling/ble edema over the past 

month.  S/P Paracentesis (10/29).  Peritoneal fluid WBC 2562, 


   . Cx no growth 48h


- Liver cirrhosis, as evidenced by ascites, splenomegaly, coagulopathy/

thrombocytopenia, hypoalbuminemia, and nodular appearance


   of the liver on CT scan.  Abdomen CT (10/28/17)--1. Bilateral pleural 

effusions and extensive ascites 2. Nodular contour to the liver and 


  splenomegaly suggests possible portal venous hypertension.  


- Hepatitis C, recent diagnosis. Has not seen GI for this, but does state he 

had an appointment scheduled for Monday with GI


  as initial visit and to get start process to get approval for Hep C 

treatment.  Genotype/viral load still pending. 


- Thrombocytopenia/Coagulopathy.  Plt 47,000, PT 20.2, INR 1.8.  


- Bilateral pleural effusions, dyspnea. CT as above. Shortness of breath 

improved s/p paracentesis. 


- Hypertension, hypothyroidism per attending. 


10/31/17 - still edematous, distended, not much change if any in anasarca and 

ascites despite increasing diuretics yesterday.  will add albumin


    


PLAN:


- albumin BID


- low sodium diet


- continue 1500cc fluid restriction


- Await HCV Genotype and viral load


- continue diuretics


- Continue Zosyn 


- Monitor labs


- Supportive care


- Further recommendations to follow based on results of above


- Patient seen and examined by Dr. Bratu and myself and this note is written on 

her behalf. 


 (Saundra Demarco)


Physician Comments


seen, examined


agree with above


paracentesis suggesting SBP-on antibiotics


SAAG more than 1.1- portal hypertension 


 (Sophie Celeste MD)











Saundra Demarco Oct 31, 2017 13:45


Sophie Celeste MD Oct 31, 2017 19:27

## 2017-10-31 NOTE — HHI.PR
Subjective


Remarks


Follow up on his liver cirrhosis


Patient always non-pleasant


Denied chest pain short of breath, he and his abdomen is reaccumulate fluid





Objective


Vitals





Vital Signs








  Date Time  Temp Pulse Resp B/P (MAP) Pulse Ox O2 Delivery O2 Flow Rate FiO2


 


10/31/17 16:00 97.4 89 16 111/57 (75) 91   


 


10/31/17 12:00 95.6 83 17 122/75 (91) 96   


 


10/31/17 08:00 96.3 84 17 113/72 (86) 93   


 


10/31/17 04:00 96.1 81 18 104/64 (77) 93   


 


10/31/17 00:00 98.6 82 18 115/73 (87) 92   














I/O      


 


 10/30/17 10/30/17 10/30/17 10/31/17 10/31/17 10/31/17





 07:00 15:00 23:00 07:00 15:00 23:00


 


Intake Total 320 ml  530 ml 100 ml  750 ml


 


Balance 320 ml  530 ml 100 ml  750 ml


 


      


 


Intake Oral 320 ml  480 ml   650 ml


 


IV Total   50 ml 100 ml  100 ml


 


# Voids 2  3   


 


# Bowel Movements 0  1   








Result Diagram:  


10/31/17 1308                                                                  

              10/31/17 1308





Objective Remarks


GENERAL: This is a well-nourished, well-developed patient, in no apparent 

distress.


CARDIOVASCULAR: Regular rate and rhythm without murmurs, gallops, or rubs. 


RESPIRATORY: Fair entry. Breath sounds equal bilaterally. No wheezes, rales, or 

rhonchi.  


GASTROINTESTINAL: Abdomen soft, non-tender, mildly distended. Normal active 

bowel sounds


MUSCULOSKELETAL: Extremities without clubbing, cyanosis, or edema.


NEURO:  Alert & Oriented x4 to person, place, time, situation.  Moves all ext x4





A/P


Assessment and Plan


10/31: Repeat LFT in a.m. follow with GI





56-year-old male admitted secondary to anasarca and bilateral pleural effusions

, with dyspnea.





Anasarca


Cirrhosis


History of alcohol abuse


Hepatitis C


Liver failure


Transaminitis


Status post paracentesis 


- albumin BID


- low sodium diet


- continue 1500cc fluid restriction


- Await HCV Genotype and viral load


- continue diuretics


- Continue Zosyn 


- Monitor labs





Lower extremity edema


Related to anasarca


Start MARY hose and SCDs


Continue diuretics





Bilateral pleural effusions


Dyspnea


Dyspnea improved status post paracentesis 


Monitor for any recurrence of shortness of breath


Consider thoracentesis based on symptoms


Continue diuresis





SIRS


Fevers resolving


Follow paracentesis cultures


No change to antibiotics (Zosyn)





Hypertension


Follow blood pressures


Blood pressures are stable status post thoracentesis





Coagulopathy


Thrombocytopenia


Decline in platelets overnight 


Continue to monitor platelets 


Related to liver disease 


Follow for signs of bleeding


Follow CBC





Hypothyroidism


Continue levothyroxin


Follows in outpatient





DVT prophylaxis


SCDs and MARY hose


No anticoagulants currently due to bleeding risk











Lianne Hough MD Oct 31, 2017 21:50

## 2017-10-31 NOTE — HHI.PR
Subjective


Remarks


Follow up on his liver cirrhosis


Patient always non-pleasant


Denied chest pain short of breath, he and his abdomen is reaccumulate fluid





Objective


Vitals





Vital Signs








  Date Time  Temp Pulse Resp B/P (MAP) Pulse Ox O2 Delivery O2 Flow Rate FiO2


 


10/31/17 16:00 97.4 89 16 111/57 (75) 91   


 


10/31/17 12:00 95.6 83 17 122/75 (91) 96   


 


10/31/17 08:00 96.3 84 17 113/72 (86) 93   


 


10/31/17 04:00 96.1 81 18 104/64 (77) 93   


 


10/31/17 00:00 98.6 82 18 115/73 (87) 92   














I/O      


 


 10/30/17 10/30/17 10/30/17 10/31/17 10/31/17 10/31/17





 07:00 15:00 23:00 07:00 15:00 23:00


 


Intake Total 320 ml  530 ml 100 ml  750 ml


 


Balance 320 ml  530 ml 100 ml  750 ml


 


      


 


Intake Oral 320 ml  480 ml   650 ml


 


IV Total   50 ml 100 ml  100 ml


 


# Voids 2  3   


 


# Bowel Movements 0  1   








Result Diagram:  


10/31/17 1308                                                                  

              10/31/17 1308





Objective Remarks


GENERAL: This is a well-nourished, well-developed patient, in no apparent 

distress.


CARDIOVASCULAR: Regular rate and rhythm without murmurs, gallops, or rubs. 


RESPIRATORY: Fair entry. Breath sounds equal bilaterally. No wheezes, rales, or 

rhonchi.  


GASTROINTESTINAL: Abdomen soft, non-tender, mildly distended. Normal active 

bowel sounds


MUSCULOSKELETAL: Extremities without clubbing, cyanosis, or edema.


NEURO:  Alert & Oriented x4 to person, place, time, situation.  Moves all ext x4





A/P


Assessment and Plan


10/31: Repeat LFT in a.m. follow with GI





56-year-old male admitted secondary to anasarca and bilateral pleural effusions

, with dyspnea.





Anasarca


Cirrhosis


History of alcohol abuse


Hepatitis C


Liver failure


Transaminitis


Status post paracentesis 


- albumin BID


- low sodium diet


- continue 1500cc fluid restriction


- Await HCV Genotype and viral load


- continue diuretics


- Continue Zosyn 


- Monitor labs





Lower extremity edema


Related to anasarca


Start MARY hose and SCDs


Continue diuretics





Bilateral pleural effusions


Dyspnea


Dyspnea improved status post paracentesis 


Monitor for any recurrence of shortness of breath


Consider thoracentesis based on symptoms


Continue diuresis





SIRS


Fevers resolving


Follow paracentesis cultures


No change to antibiotics (Zosyn)





Hypertension


Follow blood pressures


Blood pressures are stable status post thoracentesis





Coagulopathy


Thrombocytopenia


Decline in platelets overnight 


Continue to monitor platelets 


Related to liver disease 


Follow for signs of bleeding


Follow CBC





Hypothyroidism


Continue levothyroxin


Follows in outpatient





DVT prophylaxis


SCDs and MARY hose


No anticoagulants currently due to bleeding risk











Lianne Huogh MD Oct 31, 2017 21:50

## 2017-11-01 NOTE — HHI.GIFU
GI Follow-up Note


Consult Follow-up





Subjective:  Patient laying in bed , increase in abdominal distension -awaiting 

repeat paracentesis.Cough -worse today,states was also worse befor admission to 

the hospital . S/p paracentesis -portal htn, SBP 





Objective:


PHYSICAL EXAMINATION:


Vitals signs stable


No fever





Vital Signs








  Date Time  Temp Pulse Resp B/P (MAP) Pulse Ox O2 Delivery O2 Flow Rate FiO2


 


11/1/17 18:24 97.7 90 18 147/98 94   


 


11/1/17 18:01 98.0 85 18 144/84 95   


 


11/1/17 16:00 97.4 87 17 128/81 (97) 94   


 


11/1/17 12:00 97.1 84 18 117/79 (92) 92   








HEENT: Pupils round and reactive to light; normocephalic; atraumatic;  

jaundice.  Throat is clear.


NECK: Neck is supple, no JVD, no lymphadenopathy.


CHEST:  Chest is clear to auscultation and percussion.


CARDIAC:  Regular rate and rhythm with no murmur gallop or rubs.


ABDOMEN:  Soft, distended, nontender; no hepatosplenomegaly; bowel sounds are 

present in all four quadrants.


EXTREMITIES: No clubbing, cyanosis, edema.


SKIN:  Normal; no rash; jaundice.


CNS:  No focal deficits; alert and oriented times three.


Available Data (labs, X- Rays, Procedues) : 








 Laboratory Tests








Test


  10/31/17


06:30 10/31/17


13:08 11/1/17


15:29


 


Blood Urea Nitrogen 24 MG/DL  24 MG/DL  19 MG/DL 


 


Creatinine 1.02 MG/DL  1.13 MG/DL  1.05 MG/DL 


 


Random Glucose 83 MG/DL  124 MG/DL  113 MG/DL 


 


Calcium Level 7.5 MG/DL  7.9 MG/DL  7.8 MG/DL 


 


Sodium Level 134 MEQ/L  135 MEQ/L  135 MEQ/L 


 


Potassium Level 3.3 MEQ/L  3.2 MEQ/L  3.3 MEQ/L 


 


Chloride Level 100 MEQ/L  99 MEQ/L  100 MEQ/L 


 


Carbon Dioxide Level 27.1 MEQ/L  28.4 MEQ/L  26.9 MEQ/L 


 


Anion Gap 7 MEQ/L  8 MEQ/L  8 MEQ/L 


 


Estimat Glomerular Filtration


Rate 75 ML/MIN 


  67 ML/MIN 


  73 ML/MIN 


 


 


White Blood Count  5.1 TH/MM3  


 


Red Blood Count  4.27 MIL/MM3  


 


Hemoglobin  14.3 GM/DL  


 


Hematocrit  42.2 %  


 


Mean Corpuscular Volume  98.9 FL  


 


Mean Corpuscular Hemoglobin  33.4 PG  


 


Mean Corpuscular Hemoglobin


Concent 


  33.8 % 


  


 


 


Red Cell Distribution Width  18.7 %  


 


Platelet Count  55 TH/MM3  


 


Mean Platelet Volume  9.4 FL  


 


Neutrophils (%) (Auto)  57.6 %  


 


Lymphocytes (%) (Auto)  22.2 %  


 


Monocytes (%) (Auto)  14.7 %  


 


Eosinophils (%) (Auto)  4.4 %  


 


Basophils (%) (Auto)  1.1 %  


 


Neutrophils # (Auto)  2.9 TH/MM3  


 


Lymphocytes # (Auto)  1.1 TH/MM3  


 


Monocytes # (Auto)  0.7 TH/MM3  


 


Eosinophils # (Auto)  0.2 TH/MM3  


 


Basophils # (Auto)  0.1 TH/MM3  


 


CBC Comment  AUTO DIFF  


 


Differential Comment


  


  AUTO DIFF


CONFIRMED 


 


 


Platelet Estimate  LOW  


 


Platelet Morphology Comment  NORMAL  


 


Total Protein  6.6 GM/DL  


 


Albumin  1.9 GM/DL  


 


Phosphorus Level  2.2 MG/DL  2.5 MG/DL 


 


Magnesium Level  1.8 MG/DL  1.7 MG/DL 


 


Alkaline Phosphatase  110 U/L  


 


Aspartate Amino Transf


(AST/SGOT) 


  85 U/L 


  


 


 


Alanine Aminotransferase


(ALT/SGPT) 


  46 U/L 


  


 


 


Total Bilirubin  6.1 MG/DL  


 


Direct Bilirubin  3.5 MG/DL  


 


Indirect Bilirubin  2.6 MG/DL  














ASSESSMENT/PLAN:


new onset ascites-portal hypertension 


SBP -on antibiotics


liver cirrhosis most likely secondary hepatitis  c =work-up in progress 





Recommendations


daily weight


strict I/O charting


paracentesis in am-


Increase LAsix -80 mg IV BID 


TERESA, ASMA, AMA, ferritin, iron , ceruloplasmin, jenifer 1 antitrypsin 


daily weight 


2 gm sodium diet 


fu hep c viral load 





It was a pleasure seeing Pa Mcguire. Thank you for this consult.


Entered by: Sophie Mccoy MD Nov 1, 2017 18:44

## 2017-11-01 NOTE — HHI.PR
Subjective


Remarks


Denies cp/sob


c/o of increased abdominal girth and distension.


Denies fevers or chills


denies nausea, vomiting or abdominal pain





Objective


Vitals





Vital Signs








  Date Time  Temp Pulse Resp B/P (MAP) Pulse Ox O2 Delivery O2 Flow Rate FiO2


 


11/1/17 12:00 97.1 84 18 117/79 (92) 92   


 


11/1/17 08:00 96.8 98 19 126/78 (94) 93   


 


11/1/17 04:00 96.7 84 18 121/78 (92) 91   


 


11/1/17 00:00 98.2 90 18 106/69 (81) 90   


 


10/31/17 20:00 96.8 91 18 126/85 (99) 94   


 


10/31/17 20:00  89      


 


10/31/17 16:00 97.4 89 16 111/57 (75) 91   














I/O      


 


 10/31/17 10/31/17 10/31/17 11/1/17 11/1/17 11/1/17





 07:00 15:00 23:00 07:00 15:00 23:00


 


Intake Total 100 ml  750 ml 100 ml 50 ml 


 


Balance 100 ml  750 ml 100 ml 50 ml 


 


      


 


Intake Oral   650 ml   


 


IV Total 100 ml  100 ml 100 ml 50 ml 


 


# Bowel Movements   1   








Result Diagram:  


10/31/17 1308                                                                  

              10/31/17 1308





Imaging





Last Impressions








Cyst Biopsy Asp-Paracentesis US 10/29/17 0000 Signed





Impressions: 





 Service Date/Time:  Sunday, October 29, 2017 10:06 - CONCLUSION:  

Uncomplicated 





 ultrasound guided paracentesis.       Ryan Byrd MD 


 


Chest X-Ray 10/28/17 0000 Signed





Impressions: 





 Service Date/Time:  Saturday, October 28, 2017 14:27 - CONCLUSION:  1. 

Bilateral 





 pleural effusions right greater than left. 2. Hazy opacity greatest in the 

right 





 lung. 3. Mild cardiomegaly     Andrew Canada MD 


 


Abdomen/Pelvis CT 10/28/17 0000 Signed





Impressions: 





 Service Date/Time:  Saturday, October 28, 2017 16:11 - CONCLUSION:  1. 

Bilateral 





 pleural effusions and extensive ascites 2. Nodular contour to the liver and 





 splenomegaly suggests possible portal venous hypertension     Cl Howell MD 








Objective Remarks


AAOx3


Clear lungs Bl


Bowel sounds present but distant - there is significant abdominal ascites


+1 edema in lower extremities


Procedures


Ultrasound-guided abdominal paracentesis.


Medications and IVs





Current Medications








 Medications


  (Trade)  Dose


 Ordered  Sig/Mariana


 Route  Start Time


 Stop Time Status Last Admin


 


  (NS Flush)  2 ml  UNSCH  PRN


 IV FLUSH  10/28/17 17:45


    10/28/17 23:51


 


 


  (NS Flush)  2 ml  BID


 IV FLUSH  10/28/17 21:00


    11/1/17 09:18


 


 


  (Zofran Inj)  4 mg  Q6H  PRN


 IVP  10/28/17 17:45


     


 


 


  (Narcan Inj)  0.4 mg  UNSCH  PRN


 IV PUSH  10/28/17 17:45


     


 


 


 Piperacillin Sod/


 Tazobactam Sod  50 ml @ 


 100 mls/hr  Q8H


 IV  10/28/17 23:00


    11/1/17 06:17


 


 


  (Lactulose Liq)  30 ml  BID


 PO  10/28/17 21:00


    11/1/17 09:18


 


 


  (Lasix Inj)  40 mg  BID@0700,1500


 IV PUSH  10/29/17 15:00


    11/1/17 06:17


 


 


  (Mylicon Chew)  80 mg  PCHS  PRN


 CHEW  10/29/17 21:45


    10/30/17 20:51


 


 


  (Aldactone)  100 mg  DAILY


 PO  10/30/17 09:15


    11/1/17 09:18


 


 


  (Synthroid)  50 mcg  DAILY@0600


 PO  11/1/17 06:00


    11/1/17 06:17


 


 


 Albumin Human  50 ml @ 60


 mls/hr  Q12H


 IV  10/31/17 14:00


    11/1/17 00:57


 


 


  (K-Phos Neutral)  250 mg  Q8HR


 PO  11/1/17 14:00


     


 


 


  (KCl)  30 meq  ONCE  ONCE


 PO  11/1/17 14:15


 11/1/17 14:16 UNV  


 








Urinary Catheter:  No


Vascular Central Line Catheter:  No





A/P


Problem List:  


(1) Liver cirrhosis


ICD Code:  K74.60 - Unspecified cirrhosis of liver


Status:  Acute


Plan:  As evidenced by ascites, splenomegaly, coagulopathy/thrombocytopenia, 

hypoalbuminemia, and nodular appearance


of the liver on CT scan.  Abdomen CT (10/28/17)--1. Bilateral pleural effusions 

and extensive ascites 2. Nodular contour to the liver and 


splenomegaly suggests possible portal venous hypertension.  


Continue to monitor LFTs.  Total bilirubin improving and today down to 6.1 from 

7.3.  AST also slowly improving and trending down now at 85.


Continue lactulose.





(2) Anasarca


ICD Code:  R60.1 - Generalized edema


Status:  Acute


Plan:  Continuw with diuresis with Lasix and spironolactone.





(3) Ascites


ICD Code:  R18.8 - Other ascites


Status:  Acute


Plan:  Due to liver cirrhosis.


Worsening abdominal swelling/ble edema over the past month.  S/P Paracentesis (

10/29).  Peritoneal fluid WBC 2562, 


. Cx no growth 48h\


Dc IV Zosyn.





(4) Hepatitis C


ICD Code:  B19.20 - Unspecified viral hepatitis C without hepatic coma


Status:  Acute


Plan:  GI consulted.  Await HCV vital genotype and vital load.





(5) Thrombocytopenia


ICD Code:  D69.6 - Thrombocytopenia, unspecified


Status:  Acute


Plan:  Platelet count slowly improving, now 55 K. This is secondary to liver 

disease and splenic sequestration.





(6) Coagulopathy


ICD Code:  D68.9 - Coagulation defect, unspecified


Status:  Acute


Plan:  Will give vitamin K and transfuse 1 unit of FFP to bring the INR down to 

less than 1.5 in preparation for abdominal paracentesis in a.m.


INR 1.8.  Continue to monitor PT and INR.





(7) Pleural effusion associated with hepatic disorder


ICD Code:  K76.9 - Liver disease, unspecified; J91.8 - Pleural effusion in 

other conditions classified elsewhere


(8) HTN (hypertension)


ICD Code:  I10 - Essential (primary) hypertension


Plan:  Blood pressure stable.  Continue spironolactone and Lasix.





(9) Hypothyroidism


ICD Code:  E03.9 - Hypothyroidism, unspecified


Plan:  Check TSH, continue levothyroxine.





(10) Hypokalemia


ICD Code:  E87.6 - Hypokalemia


Status:  Acute


Plan:  Likely secondary to diuretic use.  Continue to replace orally and 

monitor BMP.








Problem Qualifiers





(1) Liver cirrhosis:  


Qualified Codes:  K74.60 - Unspecified cirrhosis of liver


(2) Ascites:  


Qualified Codes:  R18.8 - Other ascites


(3) Hepatitis C:  


Qualified Codes:  B17.10 - Acute hepatitis C without hepatic coma


(4) Hypothyroidism:  


Qualified Codes:  E03.9 - Hypothyroidism, unspecified








Neil Dooley MD Nov 1, 2017 14:11

## 2017-11-01 NOTE — RADRPT
EXAM DATE/TIME:  11/01/2017 16:23 

 

[facility] COMPARISON:     

CT ABDOMEN & PELVIS W/O CONTRAST, October 28, 2017, 16:11.

 

                     

INDICATIONS :     

Cough.

                     

 

MEDICAL HISTORY :     

None.          

 

SURGICAL HISTORY :     

None.   

 

ENCOUNTER:     

Initial                                        

 

ACUITY:     

3 days      

 

PAIN SCORE:     

0/10

 

LOCATION:     

Bilateral chest 

 

FINDINGS:     

There is cardiomegaly with mild interstitial edema small bilateral pleural effusions worse on the rig
ht than the left.  There is no pneumothorax.

 

CONCLUSION:     

Cardiomegaly with mild congestive failure

Small bilateral pleural effusions worse on the right.  

 

 

 Kiko Tucker MD FACR on November 01, 2017 at 16:51           

Board Certified Radiologist.

 This report was verified electronically.

## 2017-11-02 NOTE — HHI.PR
Subjective


Remarks


Late entry - patient seen at 12:00 am


Patient states abdomen is distended


denies cp/sob





Objective


Vitals





Vital Signs








  Date Time  Temp Pulse Resp B/P (MAP) Pulse Ox O2 Delivery O2 Flow Rate FiO2


 


11/2/17 21:00 98.6 84 24 127/71 (89) 94   


 


11/2/17 16:00 96.9 84 17 123/86 (98) 92   


 


11/2/17 11:30 97.6 98 16 127/84 (98) 92   


 


11/2/17 09:20 98.1 86 18 112/63 (79) 91   


 


11/2/17 04:00 97.6 79 17 121/72 (88) 95   


 


11/2/17 00:00 97.9 83 17 126/78 (94) 92   














I/O      


 


 11/1/17 11/1/17 11/1/17 11/2/17 11/2/17 11/2/17





 07:00 15:00 23:00 07:00 15:00 23:00


 


Intake Total 100 ml 50 ml 1436 ml 100 ml 50 ml 1440 ml


 


Output Total   200 ml 1000 ml  1150 ml


 


Balance 100 ml 50 ml 1236 ml -900 ml 50 ml 290 ml


 


      


 


Intake Oral   1080 ml   1440 ml


 


IV Total 100 ml 50 ml 351 ml 100 ml 50 ml 


 


Blood Product IV Normal Saline Flush   5 ml   


 


Output Urine Total   200 ml 1000 ml  1150 ml


 


# Voids   3   


 


# Bowel Movements   2   3








Result Diagram:  


11/2/17 0652                                                                   

             11/2/17 0652





Objective Remarks


AAOx3


Clear lungs Bl


Bowel sounds present but distant - there is significant abdominal ascites


+1 edema in lower extremities


Procedures


Ultrasound-guided abdominal paracentesis.





A/P


Problem List:  


(1) Liver cirrhosis


ICD Code:  K74.60 - Unspecified cirrhosis of liver


Status:  Acute


Plan:  As evidenced by ascites, splenomegaly, coagulopathy/thrombocytopenia, 

hypoalbuminemia, and nodular appearance


of the liver on CT scan.  Abdomen CT (10/28/17)--1. Bilateral pleural effusions 

and extensive ascites 2. Nodular contour to the liver and 


splenomegaly suggests possible portal venous hypertension.  


Continue to monitor LFTs.  Total bilirubin stable at 6.1.  AST also stable.


Continue lactulose.


11/2 called and discussed case with Dr Johnson. Given severe distension and 

fast reaccumulation US guided therapeutic abdominal paracentesis ordered.





(2) Anasarca


ICD Code:  R60.1 - Generalized edema


Status:  Acute


Plan:  Continue with diuresis with Lasix and spironolactone.





(3) Ascites


ICD Code:  R18.8 - Other ascites


Status:  Acute


Plan:  Due to liver cirrhosis.


Worsening abdominal swelling/ble edema over the past month.  S/P Paracentesis (

10/29).  Peritoneal fluid WBC 2562, 


. Cx no growth 48h\


Continue IV Zosyn given SBP





(4) Hepatitis C


ICD Code:  B19.20 - Unspecified viral hepatitis C without hepatic coma


Status:  Acute


Plan:  GI consulted.  Await HCV vital genotype and vital load.





(5) Thrombocytopenia


ICD Code:  D69.6 - Thrombocytopenia, unspecified


Status:  Acute


Plan:  Platelet count stable in the 50K. This is secondary to liver disease and 

splenic sequestration.


Continue to monitor cbc.





(6) Coagulopathy


ICD Code:  D68.9 - Coagulation defect, unspecified


Status:  Acute


Plan:  Will give vitamin K and transfuse 1 unit of FFP to bring the INR down to 

less than 1.5 in preparation for abdominal paracentesis in a.m.


INR 1.8.  Continue to monitor PT and INR.


11/2 INR 1.4. PAtient may go for abdominal paracentesis.





(7) Pleural effusion associated with hepatic disorder


ICD Code:  K76.9 - Liver disease, unspecified; J91.8 - Pleural effusion in 

other conditions classified elsewhere


Plan:  Treat with diuretics for now.





(8) HTN (hypertension)


ICD Code:  I10 - Essential (primary) hypertension


Plan:  Blood pressure stable.  Continue spironolactone and Lasix.





(9) Hypothyroidism


ICD Code:  E03.9 - Hypothyroidism, unspecified


Plan:  Check TSH, continue levothyroxine.


11/2 TSH elevated, check free t3 and free t4.





(10) Hypokalemia


ICD Code:  E87.6 - Hypokalemia


Status:  Acute


Plan:  Likely secondary to diuretic use.  Continue to replace orally and 

monitor BMP.





Assessment and Plan


DVT prophylaxis: SCD's, no chemoprophylaxis given increased risk of bleeding.


Discharge Planning


Pending abdominal paracentesis, Gi clearance for Dc.





Problem Qualifiers





(1) Liver cirrhosis:  


Qualified Codes:  K74.60 - Unspecified cirrhosis of liver


(2) Ascites:  


Qualified Codes:  R18.8 - Other ascites


(3) Hepatitis C:  


Qualified Codes:  B17.10 - Acute hepatitis C without hepatic coma


(4) HTN (hypertension):  


Qualified Codes:  I10 - Essential (primary) hypertension


(5) Hypothyroidism:  


Qualified Codes:  E03.9 - Hypothyroidism, unspecified








Neil Dooley MD Nov 2, 2017 21:31

## 2017-11-02 NOTE — HHI.GIFU
Subjective


Remarks


Pt resting in bed, family at bedside.  More distended and firm today.  Says his 

leg swelling is improved today. 


 (Saundra Demarco)





Objective


Vitals I&O





Vital Signs








  Date Time  Temp Pulse Resp B/P (MAP) Pulse Ox O2 Delivery O2 Flow Rate FiO2


 


11/2/17 11:30 97.6 98 16 127/84 (98) 92   


 


11/2/17 09:20 98.1 86 18 112/63 (79) 91   


 


11/2/17 04:00 97.6 79 17 121/72 (88) 95   


 


11/2/17 00:00 97.9 83 17 126/78 (94) 92   


 


11/1/17 20:00  84      


 


11/1/17 20:00 97.3 80 17 119/69 (86) 93   


 


11/1/17 19:50 97.3 77 17 129/68 93   


 


11/1/17 18:24 97.7 90 18 147/98 94   


 


11/1/17 18:01 98.0 85 18 144/84 95   














I/O      


 


 11/1/17 11/1/17 11/1/17 11/2/17 11/2/17 11/2/17





 06:59 14:59 22:59 06:59 14:59 22:59


 


Intake Total 100 ml 50 ml 1436 ml 100 ml 50 ml 


 


Output Total   200 ml 1000 ml  


 


Balance 100 ml 50 ml 1236 ml -900 ml 50 ml 


 


      


 


Intake Oral   1080 ml   


 


IV Total 100 ml 50 ml 351 ml 100 ml 50 ml 


 


Blood Product IV Normal Saline Flush   5 ml   


 


Output Urine Total   200 ml 1000 ml  


 


# Voids   3   


 


# Bowel Movements   2   








Laboratory





Laboratory Tests








Test


  11/2/17


06:52


 


White Blood Count 5.1 


 


Red Blood Count 3.82 


 


Hemoglobin 13.0 


 


Hematocrit 37.5 


 


Mean Corpuscular Volume 98.2 


 


Mean Corpuscular Hemoglobin 34.1 


 


Mean Corpuscular Hemoglobin


Concent 34.7 


 


 


Red Cell Distribution Width 19.0 


 


Platelet Count 52 


 


Mean Platelet Volume 9.6 


 


CBC Comment AUTO DIFF 


 


Differential Total Cells


Counted 100 


 


 


Neutrophils % (Manual) 45 


 


Band Neutrophils % 3 


 


Lymphocytes % 30 


 


Monocytes % 11 


 


Eosinophils % 6 


 


Basophils % 5 


 


Neutrophils # (Manual) 2.4 


 


Differential Comment


  FINAL DIFF


MANUAL


 


Platelet Estimate LOW 


 


Platelet Morphology Comment NORMAL 


 


Prothrombin Time 15.8 


 


Prothromb Time International


Ratio 1.4 


 


 


Blood Urea Nitrogen 18 


 


Creatinine 1.11 


 


Random Glucose 99 


 


Total Protein 6.8 


 


Albumin 2.3 


 


Calcium Level 7.8 


 


Phosphorus Level 3.0 


 


Magnesium Level 2.0 


 


Alkaline Phosphatase 118 


 


Aspartate Amino Transf


(AST/SGOT) 91 


 


 


Alanine Aminotransferase


(ALT/SGPT) 46 


 


 


Total Bilirubin 6.1 


 


Sodium Level 137 


 


Potassium Level 3.1 


 


Chloride Level 99 


 


Carbon Dioxide Level 28.2 


 


Anion Gap 10 


 


Estimat Glomerular Filtration


Rate 68 


 


 


Iron Level 98 


 


Total Iron Binding Capacity 178 


 


Percent Iron Saturation 55.1 


 


Ferritin 768 


 


Tumor Marker Alpha Fetoprotein 3.4 


 


Thyroid Stimulating Hormone


3rd Gen 8.730 


 














 Date/Time


Source Procedure


Growth Status


 


 


 10/28/17 15:10


Blood Peripheral Aerobic Blood Culture - Final


NO GROWTH IN 5 DAYS Complete


 


 10/28/17 15:10


Blood Peripheral Anaerobic Blood Culture - Final


NO GROWTH IN 5 DAYS Complete





 10/29/17 12:20


Fluid Peritoneal Fluid Gram Stain - Final Complete


 


 10/29/17 12:20


Fluid Peritoneal Fluid Body Fluid Culture - Final


NO GROWTH IN 72 HRS.--AEROBICALLY OR ... Complete








Imaging





Last Impressions








Chest X-Ray 11/1/17 0000 Signed





Impressions: 





 Service Date/Time:  Wednesday, November 1, 2017 16:23 - CONCLUSION:  





 Cardiomegaly with mild congestive failure Small bilateral pleural effusions 





 worse on the right.      Kiko Tucker MD  FACR


 


Cyst Biopsy Asp-Paracentesis US 10/29/17 0000 Signed





Impressions: 





 Service Date/Time:  Sunday, October 29, 2017 10:06 - CONCLUSION:  

Uncomplicated 





 ultrasound guided paracentesis.       Ryan Byrd MD 


 


Abdomen/Pelvis CT 10/28/17 0000 Signed





Impressions: 





 Service Date/Time:  Saturday, October 28, 2017 16:11 - CONCLUSION:  1. 

Bilateral 





 pleural effusions and extensive ascites 2. Nodular contour to the liver and 





 splenomegaly suggests possible portal venous hypertension     Cl Howell MD 








Physical Exam


HEENT:Normocephalic; atraumatic; no jaundice 


CHEST:  CTA


CARDIAC:  RRR


ABDOMEN:  firm, distended with ascites, nontender; no hepatosplenomegaly; bowel 

sounds are present in all four quadrants.


EXTREMITIES: +2 BLE edema


SKIN:  Normal; no rash; no jaundice.


CNS:  No focal deficits; alert and oriented times three.


 (Saundra Demarco)





Assessment and Plan


Plan


ASSESSMENT:


- New onset ascites.  Worsening abdominal swelling/ble edema over the past 

month.  S/P Paracentesis (10/29).  Peritoneal fluid WBC 2562, 


   . Cx no growth 48h    IR to repeat paracentesis.  d/w primary


- Liver cirrhosis, as evidenced by ascites, splenomegaly, coagulopathy/

thrombocytopenia, hypoalbuminemia, and nodular appearance


   of the liver on CT scan.  Abdomen CT (10/28/17)--1. Bilateral pleural 

effusions and extensive ascites 2. Nodular contour to the liver and 


  splenomegaly suggests possible portal venous hypertension.  Iron 98, TIBC 178L

, %sat 55H, ferritin 708.  


- Hepatitis C, recent diagnosis. Has not seen GI for this, but does state he 

had an appointment scheduled for Monday with GI


  as initial visit and to get start process to get approval for Hep C 

treatment.  Genotype/viral load still pending. 


- Thrombocytopenia/Coagulopathy.  


- Bilateral pleural effusions, dyspnea. CT as above. Shortness of breath 

improved s/p paracentesis. 


- Hypertension, hypothyroidism per attending. 


10/31/17 - mild improvement anasarca. abd dist and tense. going for 

paracentesis.  on albumin, 80mg lasix BID, 100mg spironolactone, fluid 

restriction.  


    


PLAN:


- await paracentesis


- cont albumin 


- low sodium diet


- continue 1500cc fluid restriction


- Await HCV Genotype and viral load


- continue diuretics


- Continue Zosyn 


- Monitor labs


- Supportive care


- Further recommendations to follow based on results of above


This pt seen by myself and Dr Medina and this note is written on his behalf 


 (Saundra Demarco)


Physician Comments


Seen and examined, agree with plan as above.


Paracentesis pending.


Will follow up with you.


 (Kodak Medina MD)











Saundra Demarco Nov 2, 2017 16:20


Kodak Medina MD Nov 2, 2017 21:27

## 2017-11-03 NOTE — RADRPT
EXAM DATE/TIME:  11/03/2017 08:40 

 

[facility] COMPARISON:     

US GUIDED ABD PARACENTESIS, October 29, 2017, 10:06.

 

 

INDICATIONS :      

Ascites. 

                     

                     

 

 

 

MEDICAL HISTORY :     

Hypothyroidism.   Liver disease.

 

SURGICAL HISTORY :     

Tonsillectomy.   Ganglion cyst removed from left wrist. Paracentesis.

 

ENCOUNTER:     

Subsequent

 

ACUITY:     

4 - 6 days

 

PAIN SCORE:     

0/10

 

LOCATION:     

Left lower quadrant

                     

 

FLUID:

Total volume of 6,500 cc of clear, yellow fluid was removed.

Fluid was sent to lab for ordered studies. 

Post procedure scanning reveals no hematoma or other complication.

 

 

TECHNIQUE:  

1.  Ultrasound guidance for abdominal paracentesis.

2.  Paracentesis.

 

The risks, benefits, and alternatives to ultrasound guided paracentesis were explained to the patient
 in detail including the risk of bleeding and infection.  Written and verbal informed consent was obt
ained.  

 

With the patient on the ultrasound table, ultrasound imaging was used to select the most appropriate 
approach for paracentesis.  Overlying skin was prepped and draped in the usual sterile fashion and wi
th a local anesthetic, a dermatotomy was made with an 11 blade scalpel.  A 6 [place] [id number] ca
theter was introduced into the peritoneal cavity and fluid was collected.   

 

The patient tolerated the procedure well and left the ultrasound suite in stable condition. 

 

CONCLUSION:     

Uncomplicated ultrasound guided paracentesis.  

 

 

 

 Job Canales MD on November 03, 2017 at 10:22           

Board Certified Radiologist.

 This report was verified electronically.

## 2017-11-03 NOTE — RADRPT
EXAM DATE/TIME:  11/03/2017 16:30 

 

[facility] COMPARISON:     

CHEST SINGLE AP, October 28, 2017, 14:27.

 

                     

INDICATIONS :     

Shortness of breath.

                     

 

MEDICAL HISTORY :            

Hypothyroidism. Liver disease.   

 

SURGICAL HISTORY :        

Tonsillectomy. Ganglion cyst removed from left wrist. Paracentesis.

 

ENCOUNTER:     

Subsequent                                        

 

ACUITY:     

4 - 6 days      

 

PAIN SCORE:     

0/10

 

LOCATION:     

Bilateral chest 

 

FINDINGS:     

Moderate bibasilar parenchymal changes the small right pleural effusion.  The heart is enlarged.  The
 pulmonary vascularity is normal.

 

CONCLUSION:     

Findings as above improved from 10/28/17

 

 

 

 Kiko Tucker MD FACR on November 03, 2017 at 17:08           

Board Certified Radiologist.

 This report was verified electronically.

## 2017-11-03 NOTE — HHI.DCPOC
Discharge Care Plan


Diagnosis:  


(1) Pleural effusion associated with hepatic disorder


(2) Anasarca


(3) Acute liver failure


(4) Pleural effusion associated with hepatic disorder


(5) Hypokalemia


(6) Ascites


(7) Coagulopathy


(8) Hypothyroidism


(9) Liver cirrhosis


(10) Thrombocytopenia


(11) HTN (hypertension)


(12) Hepatitis C


Goals to Promote Your Health


* To prevent worsening of your condition and complications


* To maintain your health at the optimal level


Directions to Meet Your Goals


*** Take your medications as prescribed


*** Follow your dietary instruction


*** Follow activity as directed








*** Keep your appointments as scheduled


*** Take your immunizations and boosters as scheduled


*** If your symptoms worsen call your PCP, if no PCP go to Urgent Care Center 

or Emergency Room***


*** Smoking is Dangerous to Your Health. Avoid second hand smoke***


***Call the 24-hour hour crisis hotline for domestic abuse at [phone]***











Neil Dooley MD Nov 3, 2017 15:13

## 2017-11-03 NOTE — HHI.PR
Subjective


Remarks


late entry - patient seen at 11:20 am


Patient status post paracentesis with 6.5 L obtained.


Ration states that he feels much better, denies abdominal pain, nausea vomiting.


Denies fevers or chills.


Vital signs stable.





Objective


Vitals





Vital Signs








  Date Time  Temp Pulse Resp B/P (MAP) Pulse Ox O2 Delivery O2 Flow Rate FiO2


 


11/3/17 12:00 96.2 84 16 112/66 (81) 94   


 


11/3/17 09:50 97.8 81 15 119/70 (86) 92   


 


11/3/17 09:35 97.8 77 14 115/67 (83) 93   


 


11/3/17 08:38 97.8 83 16 117/74 (88) 90   


 


11/3/17 08:00 96.7 85 17 112/71 (85) 92   


 


11/3/17 05:07 98.3 85 18 134/68 (90) 99   


 


11/3/17 03:38  86      


 


11/3/17 00:49 98.7 94 22 121/71 (88) 94   


 


11/2/17 21:00 98.6 84 24 127/71 (89) 94   


 


11/2/17 16:00 96.9 84 17 123/86 (98) 92   














I/O      


 


 11/2/17 11/2/17 11/2/17 11/3/17 11/3/17 11/3/17





 07:00 15:00 23:00 07:00 15:00 23:00


 


Intake Total 100 ml 50 ml 1440 ml  200 ml 


 


Output Total 1000 ml  1150 ml 1375 ml 500 ml 


 


Balance -900 ml 50 ml 290 ml -1375 ml -300 ml 


 


      


 


Intake Oral   1440 ml   


 


IV Total 100 ml 50 ml   200 ml 


 


Output Urine Total 1000 ml  1150 ml 1375 ml 500 ml 


 


# Bowel Movements   3   








Result Diagram:  


11/2/17 0652                                                                   

             11/2/17 0652





Imaging





Last Impressions








Cyst Biopsy Asp-Paracentesis US 11/3/17 0000 Signed





Impressions: 





 Service Date/Time:  Friday, November 3, 2017 08:40 - CONCLUSION:  

Uncomplicated 





 ultrasound guided paracentesis.       Job Canales MD 


 


Chest X-Ray 11/1/17 0000 Signed





Impressions: 





 Service Date/Time:  Wednesday, November 1, 2017 16:23 - CONCLUSION:  





 Cardiomegaly with mild congestive failure Small bilateral pleural effusions 





 worse on the right.      Kiko Tucker MD  FACR


 


Abdomen/Pelvis CT 10/28/17 0000 Signed





Impressions: 





 Service Date/Time:  Saturday, October 28, 2017 16:11 - CONCLUSION:  1. 

Bilateral 





 pleural effusions and extensive ascites 2. Nodular contour to the liver and 





 splenomegaly suggests possible portal venous hypertension     Cl Howell MD 








Objective Remarks


AAOx3


Clear lungs Bl


Bowel sounds present but distant - there is significant abdominal ascites


+1 edema in lower extremities


Procedures


Ultrasound-guided abdominal paracentesis.


Medications and IVs





Current Medications








 Medications


  (Trade)  Dose


 Ordered  Sig/Mariana


 Route  Start Time


 Stop Time Status Last Admin


 


  (NS Flush)  2 ml  UNSCH  PRN


 IV FLUSH  10/28/17 17:45


    10/28/17 23:51


 


 


  (NS Flush)  2 ml  BID


 IV FLUSH  10/28/17 21:00


    11/3/17 10:35


 


 


  (Zofran Inj)  4 mg  Q6H  PRN


 IVP  10/28/17 17:45


     


 


 


  (Narcan Inj)  0.4 mg  UNSCH  PRN


 IV PUSH  10/28/17 17:45


     


 


 


 Piperacillin Sod/


 Tazobactam Sod  50 ml @ 


 100 mls/hr  Q8H


 IV  10/28/17 23:00


    11/3/17 14:10


 


 


  (Lactulose Liq)  30 ml  BID


 PO  10/28/17 21:00


    11/3/17 10:27


 


 


  (Mylicon Chew)  80 mg  PCHS  PRN


 CHEW  10/29/17 21:45


    11/2/17 17:19


 


 


  (Synthroid)  50 mcg  DAILY@0600


 PO  11/1/17 06:00


    11/3/17 05:45


 


 


 Albumin Human  50 ml @ 60


 mls/hr  Q12H


 IV  10/31/17 14:00


    11/3/17 12:56


 


 


  (K-Phos Neutral)  250 mg  Q8HR


 PO  11/1/17 14:00


    11/3/17 12:57


 


 


  (Tylenol)  650 mg  Q4H  PRN


 PO  11/1/17 15:15


     


 


 


  (Benadryl)  25 mg  Q4H  PRN


 PO  11/1/17 15:15


    11/1/17 17:21


 


 


  (Lasix Inj)  80 mg  BID


 IV PUSH  11/1/17 21:00


    11/3/17 10:35


 


 


  (Aldactone)  100 mg  BID@09,18


 PO  11/2/17 09:00


    11/3/17 10:27


 








Urinary Catheter:  No


Vascular Central Line Catheter:  No





A/P


Problem List:  


(1) Liver cirrhosis


ICD Code:  K74.60 - Unspecified cirrhosis of liver


Status:  Acute


(2) Anasarca


ICD Code:  R60.1 - Generalized edema


Status:  Acute


(3) Ascites


ICD Code:  R18.8 - Other ascites


Status:  Acute


(4) Hepatitis C


ICD Code:  B19.20 - Unspecified viral hepatitis C without hepatic coma


Status:  Acute


(5) Thrombocytopenia


ICD Code:  D69.6 - Thrombocytopenia, unspecified


Status:  Acute


(6) Coagulopathy


ICD Code:  D68.9 - Coagulation defect, unspecified


Status:  Acute


(7) Pleural effusion associated with hepatic disorder


ICD Code:  K76.9 - Liver disease, unspecified; J91.8 - Pleural effusion in 

other conditions classified elsewhere


(8) HTN (hypertension)


ICD Code:  I10 - Essential (primary) hypertension


(9) Hypothyroidism


ICD Code:  E03.9 - Hypothyroidism, unspecified


(10) Hypokalemia


ICD Code:  E87.6 - Hypokalemia


Status:  Acute


Assessment and Plan


(1) Liver cirrhosis


Plan:  As evidenced by ascites, splenomegaly, coagulopathy/thrombocytopenia, 

hypoalbuminemia, and nodular appearance


of the liver on CT scan.  Abdomen CT (10/28/17)--1. Bilateral pleural effusions 

and extensive ascites 2. Nodular contour to the liver and 


splenomegaly suggests possible portal venous hypertension.  


Continue to monitor LFTs.  Total bilirubin stable at 6.1.  AST also stable.


Continue lactulose.


11/2 called and discussed case with Dr Johnson. Given severe distension and 

fast reaccumulation US guided therapeutic abdominal paracentesis ordered.


11/3 Appreciate radiology intervention.  Patient is status post abdominal 

paracentesis with 6.5 L of ascitic fluid obtained.  Discussed the case with 

 from gastroenterology, who thinks the patient can be discharged home.





(2) Anasarca


Plan:  Continue with diuresis with Lasix and spironolactone.





(3) Ascites/SBP


Plan:  Due to liver cirrhosis.


Worsening abdominal swelling/ble edema over the past month.  S/P Paracentesis (

10/29).  Peritoneal fluid WBC 2562, 


. Cx no growth 48h\


Injury with IV Zosyn.  Repeat ascitic fluid shows a peritoneal WBC of 346 and 

peritoneal RBC of 267.  SBP has been successfully treated.  I will discharge on 

oral ciprofloxacin complete a total of 10 day treatment.





(4) Hepatitis C


Plan:  GI consulted.  Await HCV vital genotype and vital load.





(5) Thrombocytopenia


Plan:  Platelet count stable in the 50K. This is secondary to liver disease and 

splenic sequestration.


Continue to monitor cbc.  No signs of active bleeding.





(6) Coagulopathy


Plan: Status post vitamin K and transfusion 1 unit of FFP to bring the INR down 

to less than 1.5 in preparation for abdominal paracentesis in a.m.


INR 1.8.  Continue to monitor PT and INR.


11/2 INR 1.4. PAtient may go for abdominal paracentesis.


11/3 Will start on oral vitamin K.





(7) Pleural effusion associated with hepatic disorder


ICD Code:  K76.9 - Liver disease, unspecified; J91.8 - Pleural effusion in 

other conditions classified elsewhere


Plan:  Treat with diuretics for now.





(8) HTN (hypertension)


ICD Code:  I10 - Essential (primary) hypertension


Plan:  Blood pressure stable.  Continue spironolactone and Lasix.





(9) Hypothyroidism


ICD Code:  E03.9 - Hypothyroidism, unspecified


Plan:  Check TSH, continue levothyroxine.


11/2 TSH elevated, check free t3 and free t4.





(10) Hypokalemia


Plan:  Likely secondary to diuretic use.  Continue to replace orally and 

monitor BMP.


11/3 awaiting bmp, patient may be discharged if potassium within normal range.











DVT prophylaxis: SCD's, no chemoprophylaxis given increased risk of bleeding.


Discharge Planning


Dc pending lab results.





Problem Qualifiers





(1) Liver cirrhosis:  


Qualified Codes:  K74.60 - Unspecified cirrhosis of liver


(2) Ascites:  


Qualified Codes:  R18.8 - Other ascites


(3) Hepatitis C:  


Qualified Codes:  B17.10 - Acute hepatitis C without hepatic coma


(4) HTN (hypertension):  


Qualified Codes:  I10 - Essential (primary) hypertension


(5) Hypothyroidism:  


Qualified Codes:  E03.9 - Hypothyroidism, unspecified








Neil Dooley MD Nov 3, 2017 15:16

## 2017-11-03 NOTE — RADRPT
EXAM DATE/TIME:  11/03/2017 08:40 

 

[facility] COMPARISON:     

US GUIDED ABD PARACENTESIS, October 29, 2017, 10:06.

 

 

INDICATIONS :      

Ascites. 

                     

                     

 

 

 

MEDICAL HISTORY :     

Hypothyroidism.   Liver disease.

 

SURGICAL HISTORY :     

Tonsillectomy.   Ganglion cyst removed from left wrist. Paracentesis.

 

ENCOUNTER:     

Subsequent

 

ACUITY:     

4 - 6 days

 

PAIN SCORE:     

0/10

 

LOCATION:     

Left lower quadrant

                     

 

FLUID:

Total volume of 6,500 cc of clear, yellow fluid was removed.

Fluid was sent to lab for ordered studies. 

Post procedure scanning reveals no hematoma or other complication.

 

 

TECHNIQUE:  

1.  Ultrasound guidance for abdominal paracentesis.

2.  Paracentesis.

 

The risks, benefits, and alternatives to ultrasound guided paracentesis were explained to the patient
 in detail including the risk of bleeding and infection.  Written and verbal informed consent was obt
ained.  

 

With the patient on the ultrasound table, ultrasound imaging was used to select the most appropriate 
approach for paracentesis.  Overlying skin was prepped and draped in the usual sterile fashion and wi
th a local anesthetic, a dermatotomy was made with an 11 blade scalpel.  A 6 [place] [id number] ca
theter was introduced into the peritoneal cavity and fluid was collected.   

 

The patient tolerated the procedure well and left the ultrasound suite in stable condition. 

 

CONCLUSION:     

Uncomplicated ultrasound guided paracentesis.  

 

 

 

 Job Canales MD on November 03, 2017 at 10:22           

Board Certified Radiologist.

 This report was verified electronically.

## 2017-11-03 NOTE — HHI.DCPOC
Discharge Care Plan


Diagnosis:  


(1) Pleural effusion associated with hepatic disorder


(2) Anasarca


(3) Acute liver failure


(4) Pleural effusion associated with hepatic disorder


(5) Hypokalemia


(6) Ascites


(7) Coagulopathy


(8) Hypothyroidism


(9) Liver cirrhosis


(10) Thrombocytopenia


(11) HTN (hypertension)


(12) Hepatitis C


Goals to Promote Your Health


* To prevent worsening of your condition and complications


* To maintain your health at the optimal level


Directions to Meet Your Goals


*** Take your medications as prescribed


*** Follow your dietary instruction


*** Follow activity as directed








*** Keep your appointments as scheduled


*** Take your immunizations and boosters as scheduled


*** If your symptoms worsen call your PCP, if no PCP go to Urgent Care Center 

or Emergency Room***


*** Smoking is Dangerous to Your Health. Avoid second hand smoke***


***Call the 24-hour hour crisis hotline for domestic abuse at 1-740.628.1115***











Neil Dooley MD Nov 3, 2017 15:13

## 2017-11-03 NOTE — RADRPT
EXAM DATE/TIME:  11/03/2017 08:40 

 

[facility] COMPARISON:     

US GUIDED ABD PARACENTESIS, October 29, 2017, 10:06.

 

 

INDICATIONS :      

Ascites. 

                     

                     

 

 

 

MEDICAL HISTORY :     

Hypothyroidism.   Liver disease.

 

SURGICAL HISTORY :     

Tonsillectomy.   Ganglion cyst removed from left wrist. Paracentesis.

 

ENCOUNTER:     

Subsequent

 

ACUITY:     

4 - 6 days

 

PAIN SCORE:     

0/10

 

LOCATION:     

Left lower quadrant

                     

 

FLUID:

Total volume of 6,500 cc of clear, yellow fluid was removed.

Fluid was sent to lab for ordered studies. 

Post procedure scanning reveals no hematoma or other complication.

 

 

TECHNIQUE:  

1.  Ultrasound guidance for abdominal paracentesis.

2.  Paracentesis.

 

The risks, benefits, and alternatives to ultrasound guided paracentesis were explained to the patient
 in detail including the risk of bleeding and infection.  Written and verbal informed consent was obt
ained.  

 

With the patient on the ultrasound table, ultrasound imaging was used to select the most appropriate 
approach for paracentesis.  Overlying skin was prepped and draped in the usual sterile fashion and wi
th a local anesthetic, a dermatotomy was made with an 11 blade scalpel.  A 6 [place] Pik-L-yrcyuepl ca
theter was introduced into the peritoneal cavity and fluid was collected.   

 

The patient tolerated the procedure well and left the ultrasound suite in stable condition. 

 

CONCLUSION:     

Uncomplicated ultrasound guided paracentesis.  

 

 

 

 Job Canales MD on November 03, 2017 at 10:22           

Board Certified Radiologist.

 This report was verified electronically.

## 2017-11-03 NOTE — HHI.PR
Subjective


Remarks


late entry - patient seen at 11:20 am


Patient status post paracentesis with 6.5 L obtained.


Ration states that he feels much better, denies abdominal pain, nausea vomiting.


Denies fevers or chills.


Vital signs stable.





Objective


Vitals





Vital Signs








  Date Time  Temp Pulse Resp B/P (MAP) Pulse Ox O2 Delivery O2 Flow Rate FiO2


 


11/3/17 12:00 96.2 84 16 112/66 (81) 94   


 


11/3/17 09:50 97.8 81 15 119/70 (86) 92   


 


11/3/17 09:35 97.8 77 14 115/67 (83) 93   


 


11/3/17 08:38 97.8 83 16 117/74 (88) 90   


 


11/3/17 08:00 96.7 85 17 112/71 (85) 92   


 


11/3/17 05:07 98.3 85 18 134/68 (90) 99   


 


11/3/17 03:38  86      


 


11/3/17 00:49 98.7 94 22 121/71 (88) 94   


 


11/2/17 21:00 98.6 84 24 127/71 (89) 94   


 


11/2/17 16:00 96.9 84 17 123/86 (98) 92   














I/O      


 


 11/2/17 11/2/17 11/2/17 11/3/17 11/3/17 11/3/17





 07:00 15:00 23:00 07:00 15:00 23:00


 


Intake Total 100 ml 50 ml 1440 ml  200 ml 


 


Output Total 1000 ml  1150 ml 1375 ml 500 ml 


 


Balance -900 ml 50 ml 290 ml -1375 ml -300 ml 


 


      


 


Intake Oral   1440 ml   


 


IV Total 100 ml 50 ml   200 ml 


 


Output Urine Total 1000 ml  1150 ml 1375 ml 500 ml 


 


# Bowel Movements   3   








Result Diagram:  


11/2/17 0652                                                                   

             11/2/17 0652





Imaging





Last Impressions








Cyst Biopsy Asp-Paracentesis US 11/3/17 0000 Signed





Impressions: 





 Service Date/Time:  Friday, November 3, 2017 08:40 - CONCLUSION:  

Uncomplicated 





 ultrasound guided paracentesis.       Job Canales MD 


 


Chest X-Ray 11/1/17 0000 Signed





Impressions: 





 Service Date/Time:  Wednesday, November 1, 2017 16:23 - CONCLUSION:  





 Cardiomegaly with mild congestive failure Small bilateral pleural effusions 





 worse on the right.      Kiko Tucker MD  FACR


 


Abdomen/Pelvis CT 10/28/17 0000 Signed





Impressions: 





 Service Date/Time:  Saturday, October 28, 2017 16:11 - CONCLUSION:  1. 

Bilateral 





 pleural effusions and extensive ascites 2. Nodular contour to the liver and 





 splenomegaly suggests possible portal venous hypertension     Cl Howlel MD 








Objective Remarks


AAOx3


Clear lungs Bl


Bowel sounds present but distant - there is significant abdominal ascites


+1 edema in lower extremities


Procedures


Ultrasound-guided abdominal paracentesis.


Medications and IVs





Current Medications








 Medications


  (Trade)  Dose


 Ordered  Sig/Mariana


 Route  Start Time


 Stop Time Status Last Admin


 


  (NS Flush)  2 ml  UNSCH  PRN


 IV FLUSH  10/28/17 17:45


    10/28/17 23:51


 


 


  (NS Flush)  2 ml  BID


 IV FLUSH  10/28/17 21:00


    11/3/17 10:35


 


 


  (Zofran Inj)  4 mg  Q6H  PRN


 IVP  10/28/17 17:45


     


 


 


  (Narcan Inj)  0.4 mg  UNSCH  PRN


 IV PUSH  10/28/17 17:45


     


 


 


 Piperacillin Sod/


 Tazobactam Sod  50 ml @ 


 100 mls/hr  Q8H


 IV  10/28/17 23:00


    11/3/17 14:10


 


 


  (Lactulose Liq)  30 ml  BID


 PO  10/28/17 21:00


    11/3/17 10:27


 


 


  (Mylicon Chew)  80 mg  PCHS  PRN


 CHEW  10/29/17 21:45


    11/2/17 17:19


 


 


  (Synthroid)  50 mcg  DAILY@0600


 PO  11/1/17 06:00


    11/3/17 05:45


 


 


 Albumin Human  50 ml @ 60


 mls/hr  Q12H


 IV  10/31/17 14:00


    11/3/17 12:56


 


 


  (K-Phos Neutral)  250 mg  Q8HR


 PO  11/1/17 14:00


    11/3/17 12:57


 


 


  (Tylenol)  650 mg  Q4H  PRN


 PO  11/1/17 15:15


     


 


 


  (Benadryl)  25 mg  Q4H  PRN


 PO  11/1/17 15:15


    11/1/17 17:21


 


 


  (Lasix Inj)  80 mg  BID


 IV PUSH  11/1/17 21:00


    11/3/17 10:35


 


 


  (Aldactone)  100 mg  BID@09,18


 PO  11/2/17 09:00


    11/3/17 10:27


 








Urinary Catheter:  No


Vascular Central Line Catheter:  No





A/P


Problem List:  


(1) Liver cirrhosis


ICD Code:  K74.60 - Unspecified cirrhosis of liver


Status:  Acute


(2) Anasarca


ICD Code:  R60.1 - Generalized edema


Status:  Acute


(3) Ascites


ICD Code:  R18.8 - Other ascites


Status:  Acute


(4) Hepatitis C


ICD Code:  B19.20 - Unspecified viral hepatitis C without hepatic coma


Status:  Acute


(5) Thrombocytopenia


ICD Code:  D69.6 - Thrombocytopenia, unspecified


Status:  Acute


(6) Coagulopathy


ICD Code:  D68.9 - Coagulation defect, unspecified


Status:  Acute


(7) Pleural effusion associated with hepatic disorder


ICD Code:  K76.9 - Liver disease, unspecified; J91.8 - Pleural effusion in 

other conditions classified elsewhere


(8) HTN (hypertension)


ICD Code:  I10 - Essential (primary) hypertension


(9) Hypothyroidism


ICD Code:  E03.9 - Hypothyroidism, unspecified


(10) Hypokalemia


ICD Code:  E87.6 - Hypokalemia


Status:  Acute


Assessment and Plan


(1) Liver cirrhosis


Plan:  As evidenced by ascites, splenomegaly, coagulopathy/thrombocytopenia, 

hypoalbuminemia, and nodular appearance


of the liver on CT scan.  Abdomen CT (10/28/17)--1. Bilateral pleural effusions 

and extensive ascites 2. Nodular contour to the liver and 


splenomegaly suggests possible portal venous hypertension.  


Continue to monitor LFTs.  Total bilirubin stable at 6.1.  AST also stable.


Continue lactulose.


11/2 called and discussed case with Dr Johnson. Given severe distension and 

fast reaccumulation US guided therapeutic abdominal paracentesis ordered.


11/3 Appreciate radiology intervention.  Patient is status post abdominal 

paracentesis with 6.5 L of ascitic fluid obtained.  Discussed the case with 

 from gastroenterology, who thinks the patient can be discharged home.





(2) Anasarca


Plan:  Continue with diuresis with Lasix and spironolactone.





(3) Ascites/SBP


Plan:  Due to liver cirrhosis.


Worsening abdominal swelling/ble edema over the past month.  S/P Paracentesis (

10/29).  Peritoneal fluid WBC 2562, 


. Cx no growth 48h\


Injury with IV Zosyn.  Repeat ascitic fluid shows a peritoneal WBC of 346 and 

peritoneal RBC of 267.  SBP has been successfully treated.  I will discharge on 

oral ciprofloxacin complete a total of 10 day treatment.





(4) Hepatitis C


Plan:  GI consulted.  Await HCV vital genotype and vital load.





(5) Thrombocytopenia


Plan:  Platelet count stable in the 50K. This is secondary to liver disease and 

splenic sequestration.


Continue to monitor cbc.  No signs of active bleeding.





(6) Coagulopathy


Plan: Status post vitamin K and transfusion 1 unit of FFP to bring the INR down 

to less than 1.5 in preparation for abdominal paracentesis in a.m.


INR 1.8.  Continue to monitor PT and INR.


11/2 INR 1.4. PAtient may go for abdominal paracentesis.


11/3 Will start on oral vitamin K.





(7) Pleural effusion associated with hepatic disorder


ICD Code:  K76.9 - Liver disease, unspecified; J91.8 - Pleural effusion in 

other conditions classified elsewhere


Plan:  Treat with diuretics for now.





(8) HTN (hypertension)


ICD Code:  I10 - Essential (primary) hypertension


Plan:  Blood pressure stable.  Continue spironolactone and Lasix.





(9) Hypothyroidism


ICD Code:  E03.9 - Hypothyroidism, unspecified


Plan:  Check TSH, continue levothyroxine.


11/2 TSH elevated, check free t3 and free t4.





(10) Hypokalemia


Plan:  Likely secondary to diuretic use.  Continue to replace orally and 

monitor BMP.


11/3 awaiting bmp, patient may be discharged if potassium within normal range.











DVT prophylaxis: SCD's, no chemoprophylaxis given increased risk of bleeding.


Discharge Planning


Dc pending lab results.





Problem Qualifiers





(1) Liver cirrhosis:  


Qualified Codes:  K74.60 - Unspecified cirrhosis of liver


(2) Ascites:  


Qualified Codes:  R18.8 - Other ascites


(3) Hepatitis C:  


Qualified Codes:  B17.10 - Acute hepatitis C without hepatic coma


(4) HTN (hypertension):  


Qualified Codes:  I10 - Essential (primary) hypertension


(5) Hypothyroidism:  


Qualified Codes:  E03.9 - Hypothyroidism, unspecified








Neil Dooley MD Nov 3, 2017 15:16

## 2017-11-03 NOTE — HHI.DCPOC
Discharge Care Plan


Diagnosis:  


(1) Pleural effusion associated with hepatic disorder


(2) Anasarca


(3) Acute liver failure


(4) Pleural effusion associated with hepatic disorder


(5) Hypokalemia


(6) Ascites


(7) Coagulopathy


(8) Hypothyroidism


(9) Liver cirrhosis


(10) Thrombocytopenia


(11) HTN (hypertension)


(12) Hepatitis C


Goals to Promote Your Health


* To prevent worsening of your condition and complications


* To maintain your health at the optimal level


Directions to Meet Your Goals


*** Take your medications as prescribed


*** Follow your dietary instruction


*** Follow activity as directed








*** Keep your appointments as scheduled


*** Take your immunizations and boosters as scheduled


*** If your symptoms worsen call your PCP, if no PCP go to Urgent Care Center 

or Emergency Room***


*** Smoking is Dangerous to Your Health. Avoid second hand smoke***


***Call the 24-hour hour crisis hotline for domestic abuse at 1-397.906.9136***











Neil Dooley MD Nov 3, 2017 15:13

## 2017-11-04 NOTE — HHI.GIFU
Subjective


Remarks


Pt walking around room. says leg swelling somewhat improved but still legs are 

tender.  Feeling better after paracentesis with 6.5L removed. 


 (Saundra Demarco)





Objective


Vitals I&O





Vital Signs








  Date Time  Temp Pulse Resp B/P (MAP) Pulse Ox O2 Delivery O2 Flow Rate FiO2


 


11/4/17 08:00 96.7 84 17 108/69 (82) 91   


 


11/4/17 06:26  85      


 


11/4/17 04:00 97.3 75 20 109/63 (78) 97   


 


11/4/17 00:00 98.6 83 20 103/61 (75) 94   


 


11/3/17 19:00 98.4 77 20 111/64 (80) 96   


 


11/3/17 16:00 96.8 83 16 93/65 (74) 94   














I/O      


 


 11/3/17 11/3/17 11/3/17 11/4/17 11/4/17 11/4/17





 07:00 15:00 23:00 07:00 15:00 23:00


 


Intake Total  250 ml 500 ml 340 ml  


 


Output Total 1375 ml 500 ml 1200 ml 900 ml  


 


Balance -1375 ml -250 ml -700 ml -560 ml  


 


      


 


Intake Oral   500 ml 240 ml  


 


IV Total  250 ml  100 ml  


 


Output Urine Total 1375 ml 500 ml 1200 ml 900 ml  


 


# Bowel Movements   0   








Laboratory





Laboratory Tests








Test


  11/3/17


17:05


 


White Blood Count 4.7 


 


Red Blood Count 4.20 


 


Hemoglobin 14.4 


 


Hematocrit 41.7 


 


Mean Corpuscular Volume 99.3 


 


Mean Corpuscular Hemoglobin 34.3 


 


Mean Corpuscular Hemoglobin


Concent 34.6 


 


 


Red Cell Distribution Width 19.1 


 


Platelet Count 46 


 


Mean Platelet Volume 9.8 


 


Neutrophils (%) (Auto) 47.9 


 


Lymphocytes (%) (Auto) 28.0 


 


Monocytes (%) (Auto) 18.2 


 


Eosinophils (%) (Auto) 5.5 


 


Basophils (%) (Auto) 0.4 


 


Neutrophils # (Auto) 2.3 


 


Lymphocytes # (Auto) 1.3 


 


Monocytes # (Auto) 0.9 


 


Eosinophils # (Auto) 0.3 


 


Basophils # (Auto) 0.0 


 


CBC Comment AUTO DIFF 


 


Differential Comment


  AUTO DIFF


CONFIRMED


 


Blood Urea Nitrogen 16 


 


Creatinine 1.31 


 


Random Glucose 95 


 


Total Protein 7.6 


 


Albumin 2.9 


 


Calcium Level 8.1 


 


Alkaline Phosphatase 117 


 


Aspartate Amino Transf


(AST/SGOT) 105 


 


 


Alanine Aminotransferase


(ALT/SGPT) 49 


 


 


Total Bilirubin 8.0 


 


Sodium Level 135 


 


Potassium Level 3.7 


 


Chloride Level 98 


 


Carbon Dioxide Level 28.5 


 


Anion Gap 9 


 


Estimat Glomerular Filtration


Rate 56 


 














 Date/Time


Source Procedure


Growth Status


 


 


 10/28/17 15:10


Blood Peripheral Aerobic Blood Culture - Final


NO GROWTH IN 5 DAYS Complete


 


 10/28/17 15:10


Blood Peripheral Anaerobic Blood Culture - Final


NO GROWTH IN 5 DAYS Complete





 11/3/17 09:00


Fluid Peritoneal Fluid Gram Stain - Final Resulted


 


 11/3/17 09:00


Fluid Peritoneal Fluid Body Fluid Culture


Pending Resulted








Imaging





Last Impressions








Cyst Biopsy Asp-Paracentesis US 11/3/17 0000 Signed





Impressions: 





 Service Date/Time:  Friday, November 3, 2017 08:40 - CONCLUSION:  

Uncomplicated 





 ultrasound guided paracentesis.       Job Canales MD 


 


Chest X-Ray 11/3/17 0000 Signed





Impressions: 





 Service Date/Time:  Friday, November 3, 2017 16:30 - CONCLUSION:  Findings as 





 above improved from 10/28/17     Kiko Tucker MD  FACR


 


Abdomen/Pelvis CT 10/28/17 0000 Signed





Impressions: 





 Service Date/Time:  Saturday, October 28, 2017 16:11 - CONCLUSION:  1. 

Bilateral 





 pleural effusions and extensive ascites 2. Nodular contour to the liver and 





 splenomegaly suggests possible portal venous hypertension     Cl Howell MD 








Physical Exam


HEENT:Normocephalic; atraumatic; +icterus


CHEST:  CTA


CARDIAC:  RRR


ABDOMEN: soft, distended, nontender; no hepatosplenomegaly; bowel sounds are 

present in all four quadrants.


EXTREMITIES: +2 BLE edema


SKIN:  Normal; no rash; no jaundice.


CNS:  No focal deficits; alert and oriented times three.


 (Saundra Demarco [facility])





Assessment and Plan


Plan


ASSESSMENT:


- New onset ascites.  Worsening abdominal swelling/ble edema over the past 

month.  S/P Paracentesis (10/29).  Peritoneal fluid WBC 2562, 


   . Cx no growth 48h     s/p paracentesis, 6.5L removed.  


- Liver cirrhosis, as evidenced by ascites, splenomegaly, coagulopathy/

thrombocytopenia, hypoalbuminemia, and nodular appearance


   of the liver on CT scan.  Abdomen CT (10/28/17)--1. Bilateral pleural 

effusions and extensive ascites 2. Nodular contour to the liver and 


  splenomegaly suggests possible portal venous hypertension.  Iron 98, TIBC 178L

, %sat 55H, ferritin 708.  bilirubin increasing, now with icterus  


- Hepatitis C, recent diagnosis. genotype 1a, quant 775,000.


- Thrombocytopenia/Coagulopathy.  


- Bilateral pleural effusions, dyspnea. CT as above. Shortness of breath 

improved s/p paracentesis. 


- Hypertension, hypothyroidism per attending. 


 


    


PLAN:


- low sodium diet


- fluid restriction 


- continue diuretics


 - Monitor labs


- Supportive care


- f/u with GI as outpatient for hcv tx





This pt seen by myself and Dr Medina and this note is written on his behalf 


 (Saundra Demarco)


Physician Comments


Seen with deng Ivy as above.


Further recommendations to follow.


 (Kodak Medina MD)











Saundra Demarco Nov 4, 2017 12:29


Kodak Medina MD Nov 4, 2017 16:14

## 2017-11-04 NOTE — HHI.GIFU
Subjective


Remarks


Pt walking around room. says leg swelling somewhat improved but still legs are 

tender.  Feeling better after paracentesis with 6.5L removed. 


 (Saundra Demarco)





Objective


Vitals I&O





Vital Signs








  Date Time  Temp Pulse Resp B/P (MAP) Pulse Ox O2 Delivery O2 Flow Rate FiO2


 


11/4/17 08:00 96.7 84 17 108/69 (82) 91   


 


11/4/17 06:26  85      


 


11/4/17 04:00 97.3 75 20 109/63 (78) 97   


 


11/4/17 00:00 98.6 83 20 103/61 (75) 94   


 


11/3/17 19:00 98.4 77 20 111/64 (80) 96   


 


11/3/17 16:00 96.8 83 16 93/65 (74) 94   














I/O      


 


 11/3/17 11/3/17 11/3/17 11/4/17 11/4/17 11/4/17





 07:00 15:00 23:00 07:00 15:00 23:00


 


Intake Total  250 ml 500 ml 340 ml  


 


Output Total 1375 ml 500 ml 1200 ml 900 ml  


 


Balance -1375 ml -250 ml -700 ml -560 ml  


 


      


 


Intake Oral   500 ml 240 ml  


 


IV Total  250 ml  100 ml  


 


Output Urine Total 1375 ml 500 ml 1200 ml 900 ml  


 


# Bowel Movements   0   








Laboratory





Laboratory Tests








Test


  11/3/17


17:05


 


White Blood Count 4.7 


 


Red Blood Count 4.20 


 


Hemoglobin 14.4 


 


Hematocrit 41.7 


 


Mean Corpuscular Volume 99.3 


 


Mean Corpuscular Hemoglobin 34.3 


 


Mean Corpuscular Hemoglobin


Concent 34.6 


 


 


Red Cell Distribution Width 19.1 


 


Platelet Count 46 


 


Mean Platelet Volume 9.8 


 


Neutrophils (%) (Auto) 47.9 


 


Lymphocytes (%) (Auto) 28.0 


 


Monocytes (%) (Auto) 18.2 


 


Eosinophils (%) (Auto) 5.5 


 


Basophils (%) (Auto) 0.4 


 


Neutrophils # (Auto) 2.3 


 


Lymphocytes # (Auto) 1.3 


 


Monocytes # (Auto) 0.9 


 


Eosinophils # (Auto) 0.3 


 


Basophils # (Auto) 0.0 


 


CBC Comment AUTO DIFF 


 


Differential Comment


  AUTO DIFF


CONFIRMED


 


Blood Urea Nitrogen 16 


 


Creatinine 1.31 


 


Random Glucose 95 


 


Total Protein 7.6 


 


Albumin 2.9 


 


Calcium Level 8.1 


 


Alkaline Phosphatase 117 


 


Aspartate Amino Transf


(AST/SGOT) 105 


 


 


Alanine Aminotransferase


(ALT/SGPT) 49 


 


 


Total Bilirubin 8.0 


 


Sodium Level 135 


 


Potassium Level 3.7 


 


Chloride Level 98 


 


Carbon Dioxide Level 28.5 


 


Anion Gap 9 


 


Estimat Glomerular Filtration


Rate 56 


 














 Date/Time


Source Procedure


Growth Status


 


 


 10/28/17 15:10


Blood Peripheral Aerobic Blood Culture - Final


NO GROWTH IN 5 DAYS Complete


 


 10/28/17 15:10


Blood Peripheral Anaerobic Blood Culture - Final


NO GROWTH IN 5 DAYS Complete





 11/3/17 09:00


Fluid Peritoneal Fluid Gram Stain - Final Resulted


 


 11/3/17 09:00


Fluid Peritoneal Fluid Body Fluid Culture


Pending Resulted








Imaging





Last Impressions








Cyst Biopsy Asp-Paracentesis US 11/3/17 0000 Signed





Impressions: 





 Service Date/Time:  Friday, November 3, 2017 08:40 - CONCLUSION:  

Uncomplicated 





 ultrasound guided paracentesis.       Job Canales MD 


 


Chest X-Ray 11/3/17 0000 Signed





Impressions: 





 Service Date/Time:  Friday, November 3, 2017 16:30 - CONCLUSION:  Findings as 





 above improved from 10/28/17     Kiko Tucker MD  FACR


 


Abdomen/Pelvis CT 10/28/17 0000 Signed





Impressions: 





 Service Date/Time:  Saturday, October 28, 2017 16:11 - CONCLUSION:  1. 

Bilateral 





 pleural effusions and extensive ascites 2. Nodular contour to the liver and 





 splenomegaly suggests possible portal venous hypertension     Cl Howell MD 








Physical Exam


HEENT:Normocephalic; atraumatic; +icterus


CHEST:  CTA


CARDIAC:  RRR


ABDOMEN: soft, distended, nontender; no hepatosplenomegaly; bowel sounds are 

present in all four quadrants.


EXTREMITIES: +2 BLE edema


SKIN:  Normal; no rash; no jaundice.


CNS:  No focal deficits; alert and oriented times three.


 (Saundra Demarco [facility])





Assessment and Plan


Plan


ASSESSMENT:


- New onset ascites.  Worsening abdominal swelling/ble edema over the past 

month.  S/P Paracentesis (10/29).  Peritoneal fluid WBC 2562, 


   . Cx no growth 48h     s/p paracentesis, 6.5L removed.  


- Liver cirrhosis, as evidenced by ascites, splenomegaly, coagulopathy/

thrombocytopenia, hypoalbuminemia, and nodular appearance


   of the liver on CT scan.  Abdomen CT (10/28/17)--1. Bilateral pleural 

effusions and extensive ascites 2. Nodular contour to the liver and 


  splenomegaly suggests possible portal venous hypertension.  Iron 98, TIBC 178L

, %sat 55H, ferritin 708.  bilirubin increasing, now with icterus  


- Hepatitis C, recent diagnosis. genotype 1a, quant 775,000.


- Thrombocytopenia/Coagulopathy.  


- Bilateral pleural effusions, dyspnea. CT as above. Shortness of breath 

improved s/p paracentesis. 


- Hypertension, hypothyroidism per attending. 


 


    


PLAN:


- low sodium diet


- fluid restriction 


- continue diuretics


 - Monitor labs


- Supportive care


- f/u with GI as outpatient for hcv tx





This pt seen by myself and Dr Medina and this note is written on his behalf 


 (Saundra Demarco)


Physician Comments


Seen with deng Ivy as above.


Further recommendations to follow.


 (Kodak Medina MD)











Saundra Demarco Nov 4, 2017 12:29


Kodak Medina MD Nov 4, 2017 16:14

## 2017-11-04 NOTE — RADRPT
EXAM DATE/TIME:  11/04/2017 12:13 

 

[facility] COMPARISON:     

No previous studies available for comparison.

        

 

 

INDICATIONS :                

Bilateral leg pain.

            

 

MEDICAL HISTORY :     

Hypothyroidism.   Bilateral leg pain.

 

SURGICAL HISTORY :     

Tonsillectomy. 

 

ENCOUNTER:     

Initial

 

ACUITY:     

1 day

 

PAIN SCORE:      

2/10

 

LOCATION:      

Bilateral  legs.

                       

 

TECHNIQUE:     

Venous ultrasound of the left and right leg was performed from the inguinal ligament to the proximal 
calf.  Real-time, color Doppler and spectral tracing, compression and augmentation techniques were us
ed.  

 

FINDINGS:     

 

RIGHT LEG:     

There is normal compressibility of the deep venous system from the inguinal region to the proximal ca
lf.  No echogenic clot is seen in the lumen of the common femoral, femoral, popliteal, and posterior 
tibial veins.  There is a normal response of the venous system to proximal and distal augmentation an
d respiration.  

 

LEFT LEG:     

There is normal compressibility of the deep venous system from the inguinal region to the proximal ca
lf.  No echogenic clot is seen in the lumen of the common femoral, femoral, popliteal, and posterior 
tibial veins.  There is a normal response of the venous system to proximal and distal augmentation an
d respiration.  

 

CONCLUSION:     

No venous thrombosis of either lower extremity.

 

 

 

 Leonard Machuca MD on November 04, 2017 at 12:41           

Board Certified Radiologist.

 This report was verified electronically.

## 2017-11-04 NOTE — HHI.DS
__________________________________________________





Discharge Summary


Admission Date


Oct 28, 2017 at 17:00


Discharge Date:  Nov 4, 2017


Admitting Diagnosis





anasarca,pleural effusion, liver failure, fever





(1) Liver cirrhosis


ICD Code:  K74.60 - Unspecified cirrhosis of liver


Status:  Acute


(2) Anasarca


ICD Code:  R60.1 - Generalized edema


Status:  Acute


(3) Ascites


ICD Code:  R18.8 - Other ascites


Status:  Acute


(4) Hepatitis C


ICD Code:  B19.20 - Unspecified viral hepatitis C without hepatic coma


Status:  Acute


(5) Thrombocytopenia


ICD Code:  D69.6 - Thrombocytopenia, unspecified


Status:  Acute


(6) Coagulopathy


ICD Code:  D68.9 - Coagulation defect, unspecified


Status:  Acute


(7) Pleural effusion associated with hepatic disorder


ICD Code:  K76.9 - Liver disease, unspecified; J91.8 - Pleural effusion in 

other conditions classified elsewhere


(8) HTN (hypertension)


ICD Code:  I10 - Essential (primary) hypertension


(9) Hypothyroidism


ICD Code:  E03.9 - Hypothyroidism, unspecified


(10) Hypokalemia


ICD Code:  E87.6 - Hypokalemia


Status:  Acute


Procedures


Ultrasound-guided abdominal paracentesis.


Brief History - From Admission


Written by Shraddha Foreman, acting as scribe for Dr. Limon on 10/28/17 at 17:

47. 





This is a 56-year-old male with a past medical history significant for alcohol 

abuse, hepatitis C untreated, hypertension and hypothyroidism who presents to 

[facility] ED with complaints of progressive swelling in his abdomen for 

the past 4 weeks.  Patient admits to history of heavy alcohol use and reports 

his last drink was 4-5 days after Labor Day.  Since that time, patient states 

she's been experiencing increased swelling in his abdomen as well as in his 

lower extremities.  About 2-3 weeks ago he began having progressive abdominal 

pain.  His daughter who is at the bedside states that she began to notice his 

eyes turning yellow around Father's Day of this year.  He is having a difficult 

time breathing.  He has begun to have some swelling in his scrotum.  He 

endorses being more tired and sleeping most of the day.  Patient has his 

initial GI appointment scheduled for this Monday and was to begin the process 

to obtain approval to begin hepatitis C treatment.  Patient denies any fever or 

chills.  Patient denies any complaints of nausea or vomiting.  He reports she's 

had a good appetite.  He denies any diarrhea or constipation.  In the ED, 

patient is febrile with temperature 102.8.  White count is within normal 

limits.  LFTs are elevated.  Platelet count is 105.


INR is 1.5.  Chest x-ray shows bilateral pleural effusions right greater than 

left and a hazy opacity greatest in the right lung.  CT of the abdomen and 

pelvis shows bilateral pleural effusions and extensive ascites as well as 

nodular contour to the liver and splenomegaly suggestive of possible portal 

venous hypertension.


CBC/BMP:  


11/3/17 1705                                                                   

             11/3/17 1705





Significant Findings





Laboratory Tests








Test


  11/1/17


15:29 11/2/17


06:52 11/3/17


09:00 11/3/17


17:05


 


Blood Urea Nitrogen


  19 MG/DL


(7-18) 


  


  


 


 


Random Glucose


  113 MG/DL


() 


  


  


 


 


Calcium Level


  7.8 MG/DL


(8.5-10.1) 7.8 MG/DL


(8.5-10.1) 


  8.1 MG/DL


(8.5-10.1)


 


Sodium Level


  135 MEQ/L


(136-145) 


  


  135 MEQ/L


(136-145)


 


Potassium Level


  3.3 MEQ/L


(3.5-5.1) 3.1 MEQ/L


(3.5-5.1) 


  


 


 


Estimat Glomerular Filtration


Rate 73 ML/MIN


(>89) 68 ML/MIN


(>89) 


  56 ML/MIN


(>89)


 


Red Blood Count


  


  3.82 MIL/MM3


(4.50-5.90) 


  4.20 MIL/MM3


(4.50-5.90)


 


Hematocrit


  


  37.5 %


(39.0-51.0) 


  


 


 


Mean Corpuscular Hemoglobin


  


  34.1 PG


(27.0-34.0) 


  34.3 PG


(27.0-34.0)


 


Red Cell Distribution Width


  


  19.0 %


(11.6-17.2) 


  19.1 %


(11.6-17.2)


 


Platelet Count


  


  52 TH/MM3


(150-450) 


  46 TH/MM3


(150-450)


 


Monocytes %  11 % (0-8)   


 


Eosinophils %  6 % (0-4)   


 


Basophils %  5 % (0-2)   


 


Platelet Estimate  LOW (NORMAL)   


 


Prothrombin Time


  


  15.8 SEC


(9.8-11.6) 


  


 


 


Albumin


  


  2.3 GM/DL


(3.4-5.0) 


  2.9 GM/DL


(3.4-5.0)


 


Alkaline Phosphatase


  


  118 U/L


() 


  


 


 


Aspartate Amino Transf


(AST/SGOT) 


  91 U/L (15-37) 


  


  105 U/L


(15-37)


 


Total Bilirubin


  


  6.1 MG/DL


(0.2-1.0) 


  8.0 MG/DL


(0.2-1.0)


 


Total Iron Binding Capacity


  


  178 MCG/DL


(250-450) 


  


 


 


Percent Iron Saturation  55.1 % (20-50)   


 


Ferritin


  


  768 NG/ML


() 


  


 


 


Thyroid Stimulating Hormone


3rd Gen 


  8.730 uIU/ML


(0.358-3.740) 


  


 


 


Immunoglobulin A


  


  534 mg/dL


() 


  


 


 


Peritoneal Fluid WBC


  


  


  346 /MM3


(0-10) 


 


 


Peritoneal Fluid RBC   267 /MM3 (0-0)  


 


Monocytes (%) (Auto)


  


  


  


  18.2 %


(0.0-8.0)


 


Eosinophils (%) (Auto)


  


  


  


  5.5 %


(0.0-4.0)


 


Creatinine


  


  


  


  1.31 MG/DL


(0.60-1.30)








Imaging





Last Impressions








Cyst Biopsy Asp-Paracentesis US 11/3/17 0000 Signed





Impressions: 





 Service Date/Time:  Friday, November 3, 2017 08:40 - CONCLUSION:  

Uncomplicated 





 ultrasound guided paracentesis.       Job Canales MD 


 


Chest X-Ray 11/3/17 0000 Signed





Impressions: 





 Service Date/Time:  Friday, November 3, 2017 16:30 - CONCLUSION:  Findings as 





 above improved from 10/28/17     Kiko Tucker MD  FACR


 


Abdomen/Pelvis CT 10/28/17 0000 Signed





Impressions: 





 Service Date/Time:  Saturday, October 28, 2017 16:11 - CONCLUSION:  1. 

Bilateral 





 pleural effusions and extensive ascites 2. Nodular contour to the liver and 





 splenomegaly suggests possible portal venous hypertension     Cl Howell MD 








PE at Discharge


AAOx3


Clear lungs Bl


Bowel sounds present but distant - there is significant abdominal ascites


+1 edema in lower extremities


Pt update on day of discharge


The patient complained of bilateral calf pain which were tender to palpation.  

Venous Dopplers were ordered and negative for DVT.  The patient is clear to 

discharged


Pt Condition on Discharge:  Stable


Discharge Disposition:  Discharge Home


Discharge Time:  > 30 minutes


Discharge Instructions


DIET: Follow Instructions for:  Heart Healthy Diet, Low Sodium Diet


Activities you can perform:  Regular-No Restrictions


Activities to Avoid:  Prolonged Standing, Strenuous Activity


Follow up Referrals:  


PCP Follow-up - 2-3 Days





New Medications:  


Furosemide (Furosemide) 40 Mg Tab


40 MG PO BID for edema, #60 TAB 0 Refills





Phytonadione (Mephyton) 5 Mg Tab


5 MG PO DAILY for Alcohol Detox, #30 TAB





Potassium Phosphate-Sodium Phosphate (K-Phos Neutral) 155-852-130 Mg Tab


250 MG PO Q8HR for electrolyte abnormalities, #30 TAB





Spironolactone (Aldactone) 100 Mg Tab


100 MG PO BID@09,18 for edema, #62 TAB





[Lactulose Liq] () 30 ML SYRP


30 ML PO BID for prevent elevated ammonia, #1 BOTTLE 1 Refill





 


Continued Medications:  


Levothyroxine (Levothyroxine) 50 Mcg Tab


50 MCG PO DAILY for Thyroid, #30 TAB 0 Refills (This prescription has been 

renewed)





Pantoprazole Sod (Protonix) 40 Mg Tabdr


40 MG PO DAILY





 


Discontinued Medications:  


Levothyroxine Sodium (Levothroid) 75 Mcg Tab


75 MCG PO DAILY

















Neil Dooley MD Nov 4, 2017 11:13

## 2017-11-04 NOTE — HHI.PR
Subjective


Remarks


c/o BL calf pain


denies cp/sob


denies fevers/chills





Objective


Vitals





Vital Signs








  Date Time  Temp Pulse Resp B/P (MAP) Pulse Ox O2 Delivery O2 Flow Rate FiO2


 


11/4/17 08:00 96.7 84 17 108/69 (82) 91   


 


11/4/17 06:26  85      


 


11/4/17 04:00 97.3 75 20 109/63 (78) 97   


 


11/4/17 00:00 98.6 83 20 103/61 (75) 94   


 


11/3/17 19:00 98.4 77 20 111/64 (80) 96   


 


11/3/17 16:00 96.8 83 16 93/65 (74) 94   














I/O      


 


 11/3/17 11/3/17 11/3/17 11/4/17 11/4/17 11/4/17





 07:00 15:00 23:00 07:00 15:00 23:00


 


Intake Total  250 ml 500 ml 340 ml  


 


Output Total 1375 ml 500 ml 1200 ml 900 ml  


 


Balance -1375 ml -250 ml -700 ml -560 ml  


 


      


 


Intake Oral   500 ml 240 ml  


 


IV Total  250 ml  100 ml  


 


Output Urine Total 1375 ml 500 ml 1200 ml 900 ml  


 


# Bowel Movements   0   








Result Diagram:  


11/3/17 1705                                                                   

             11/3/17 1705





Imaging





Last Impressions








Cyst Biopsy Asp-Paracentesis US 11/3/17 0000 Signed





Impressions: 





 Service Date/Time:  Friday, November 3, 2017 08:40 - CONCLUSION:  

Uncomplicated 





 ultrasound guided paracentesis.       Job Canales MD 


 


Chest X-Ray 11/3/17 0000 Signed





Impressions: 





 Service Date/Time:  Friday, November 3, 2017 16:30 - CONCLUSION:  Findings as 





 above improved from 10/28/17     Kiko Tucker MD  FACR


 


Abdomen/Pelvis CT 10/28/17 0000 Signed





Impressions: 





 Service Date/Time:  Saturday, October 28, 2017 16:11 - CONCLUSION:  1. 

Bilateral 





 pleural effusions and extensive ascites 2. Nodular contour to the liver and 





 splenomegaly suggests possible portal venous hypertension     Cl Howell MD 








Objective Remarks


AAOx3


Clear lungs Bl


Bowel sounds present but distant - there is significant abdominal ascites


+1 edema in lower extremities


Procedures


Ultrasound-guided abdominal paracentesis.


Medications and IVs





Current Medications








 Medications


  (Trade)  Dose


 Ordered  Sig/Mariana


 Route  Start Time


 Stop Time Status Last Admin


 


  (NS Flush)  2 ml  UNSCH  PRN


 IV FLUSH  10/28/17 17:45


    10/28/17 23:51


 


 


  (NS Flush)  2 ml  BID


 IV FLUSH  10/28/17 21:00


    11/4/17 08:38


 


 


  (Zofran Inj)  4 mg  Q6H  PRN


 IVP  10/28/17 17:45


     


 


 


  (Narcan Inj)  0.4 mg  UNSCH  PRN


 IV PUSH  10/28/17 17:45


     


 


 


 Piperacillin Sod/


 Tazobactam Sod  50 ml @ 


 100 mls/hr  Q8H


 IV  10/28/17 23:00


    11/4/17 06:14


 


 


  (Lactulose Liq)  30 ml  BID


 PO  10/28/17 21:00


    11/4/17 08:37


 


 


  (Mylicon Chew)  80 mg  PCHS  PRN


 CHEW  10/29/17 21:45


    11/2/17 17:19


 


 


  (Synthroid)  50 mcg  DAILY@0600


 PO  11/1/17 06:00


    11/4/17 06:14


 


 


 Albumin Human  50 ml @ 60


 mls/hr  Q12H


 IV  10/31/17 14:00


    11/4/17 02:24


 


 


  (K-Phos Neutral)  250 mg  Q8HR


 PO  11/1/17 14:00


    11/4/17 06:14


 


 


  (Tylenol)  650 mg  Q4H  PRN


 PO  11/1/17 15:15


     


 


 


  (Benadryl)  25 mg  Q4H  PRN


 PO  11/1/17 15:15


    11/1/17 17:21


 


 


  (Lasix Inj)  80 mg  BID


 IV PUSH  11/1/17 21:00


    11/4/17 08:51


 


 


  (Aldactone)  100 mg  BID@09,18


 PO  11/2/17 09:00


    11/4/17 08:37


 


 


  (Mephyton)  5 mg  DAILY


 PO  11/3/17 18:00


    11/4/17 08:37


 











A/P


Problem List:  


(1) Liver cirrhosis


ICD Code:  K74.60 - Unspecified cirrhosis of liver


Status:  Acute


(2) Anasarca


ICD Code:  R60.1 - Generalized edema


Status:  Acute


(3) Ascites


ICD Code:  R18.8 - Other ascites


Status:  Acute


(4) Hepatitis C


ICD Code:  B19.20 - Unspecified viral hepatitis C without hepatic coma


Status:  Acute


(5) Thrombocytopenia


ICD Code:  D69.6 - Thrombocytopenia, unspecified


Status:  Acute


(6) Coagulopathy


ICD Code:  D68.9 - Coagulation defect, unspecified


Status:  Acute


(7) Pleural effusion associated with hepatic disorder


ICD Code:  K76.9 - Liver disease, unspecified; J91.8 - Pleural effusion in 

other conditions classified elsewhere


(8) HTN (hypertension)


ICD Code:  I10 - Essential (primary) hypertension


(9) Hypothyroidism


ICD Code:  E03.9 - Hypothyroidism, unspecified


(10) Hypokalemia


ICD Code:  E87.6 - Hypokalemia


Status:  Acute


(11) Bilateral calf pain


ICD Code:  M79.661 - Pain in right lower leg; M79.662 - Pain in left lower leg


Plan:  


On exam the patient has bilateral calf pain.  Will check Dopplers to rule out 

DVT.





Assessment and Plan


(1) Liver cirrhosis


Plan:  As evidenced by ascites, splenomegaly, coagulopathy/thrombocytopenia, 

hypoalbuminemia, and nodular appearance


of the liver on CT scan.  Abdomen CT (10/28/17)--1. Bilateral pleural effusions 

and extensive ascites 2. Nodular contour to the liver and 


splenomegaly suggests possible portal venous hypertension.  


Continue to monitor LFTs.  Total bilirubin stable at 6.1.  AST also stable.


Continue lactulose.


11/2 called and discussed case with Dr Johnson. Given severe distension and 

fast reaccumulation US guided therapeutic abdominal paracentesis ordered.


11/3 Appreciate radiology intervention.  Patient is status post abdominal 

paracentesis with 6.5 L of ascitic fluid obtained.  Discussed the case with 

 from gastroenterology, who thinks the patient can be discharged home.





(2) Anasarca


Plan:  Continue with diuresis with Lasix and spironolactone.





(3) Ascites/SBP


Plan:  Due to liver cirrhosis.


Worsening abdominal swelling/ble edema over the past month.  S/P Paracentesis (

10/29).  Peritoneal fluid WBC 2562, 


. Cx no growth 48h\


Injury with IV Zosyn.  Repeat ascitic fluid shows a peritoneal WBC of 346 and 

peritoneal RBC of 267.  SBP has been successfully treated.  I will discharge on 

oral ciprofloxacin complete a total of 10 day treatment.





(4) Hepatitis C


Plan:  GI consulted.  Await HCV vital genotype and vital load.





(5) Thrombocytopenia


Plan:  Platelet count stable in the 50K. This is secondary to liver disease and 

splenic sequestration.


Continue to monitor cbc.  No signs of active bleeding.





(6) Coagulopathy


Plan: Status post vitamin K and transfusion 1 unit of FFP to bring the INR down 

to less than 1.5 in preparation for abdominal paracentesis in a.m.


INR 1.8.  Continue to monitor PT and INR.


11/2 INR 1.4. PAtient may go for abdominal paracentesis.


11/3 Will start on oral vitamin K.





(7) Pleural effusion associated with hepatic disorder


ICD Code:  K76.9 - Liver disease, unspecified; J91.8 - Pleural effusion in 

other conditions classified elsewhere


Plan:  Treat with diuretics for now.





(8) HTN (hypertension)


ICD Code:  I10 - Essential (primary) hypertension


Plan:  Blood pressure stable.  Continue spironolactone and Lasix.





(9) Hypothyroidism


ICD Code:  E03.9 - Hypothyroidism, unspecified


Plan:  Check TSH, continue levothyroxine.


11/2 TSH elevated, check free t3 and free t4.





(10) Hypokalemia


Plan:  Likely secondary to diuretic use.  Continue to replace orally and 

monitor BMP.


11/3 awaiting bmp, patient may be discharged if potassium within normal range.











DVT prophylaxis: SCD's, no chemoprophylaxis given increased risk of bleeding.


Discharge Planning


DC pending Doppler ultrasounds.





Problem Qualifiers





(1) Liver cirrhosis:  


Qualified Codes:  K74.60 - Unspecified cirrhosis of liver


(2) Ascites:  


Qualified Codes:  R18.8 - Other ascites


(3) Hepatitis C:  


Qualified Codes:  B17.10 - Acute hepatitis C without hepatic coma


(4) HTN (hypertension):  


Qualified Codes:  I10 - Essential (primary) hypertension


(5) Hypothyroidism:  


Qualified Codes:  E03.9 - Hypothyroidism, unspecified








Neil Dooley MD Nov 4, 2017 12:17

## 2017-11-04 NOTE — HHI.DS
__________________________________________________





Discharge Summary


Admission Date


Oct 28, 2017 at 17:00


Discharge Date:  Nov 4, 2017


Admitting Diagnosis





anasarca,pleural effusion, liver failure, fever





(1) Liver cirrhosis


ICD Code:  K74.60 - Unspecified cirrhosis of liver


Status:  Acute


(2) Anasarca


ICD Code:  R60.1 - Generalized edema


Status:  Acute


(3) Ascites


ICD Code:  R18.8 - Other ascites


Status:  Acute


(4) Hepatitis C


ICD Code:  B19.20 - Unspecified viral hepatitis C without hepatic coma


Status:  Acute


(5) Thrombocytopenia


ICD Code:  D69.6 - Thrombocytopenia, unspecified


Status:  Acute


(6) Coagulopathy


ICD Code:  D68.9 - Coagulation defect, unspecified


Status:  Acute


(7) Pleural effusion associated with hepatic disorder


ICD Code:  K76.9 - Liver disease, unspecified; J91.8 - Pleural effusion in 

other conditions classified elsewhere


(8) HTN (hypertension)


ICD Code:  I10 - Essential (primary) hypertension


(9) Hypothyroidism


ICD Code:  E03.9 - Hypothyroidism, unspecified


(10) Hypokalemia


ICD Code:  E87.6 - Hypokalemia


Status:  Acute


Procedures


Ultrasound-guided abdominal paracentesis.


Brief History - From Admission


Written by Shraddha Foreman, acting as scribe for Dr. Limon on 10/28/17 at 17:

47. 





This is a 56-year-old male with a past medical history significant for alcohol 

abuse, hepatitis C untreated, hypertension and hypothyroidism who presents to 

[facility] ED with complaints of progressive swelling in his abdomen for 

the past 4 weeks.  Patient admits to history of heavy alcohol use and reports 

his last drink was 4-5 days after Labor Day.  Since that time, patient states 

she's been experiencing increased swelling in his abdomen as well as in his 

lower extremities.  About 2-3 weeks ago he began having progressive abdominal 

pain.  His daughter who is at the bedside states that she began to notice his 

eyes turning yellow around Father's Day of this year.  He is having a difficult 

time breathing.  He has begun to have some swelling in his scrotum.  He 

endorses being more tired and sleeping most of the day.  Patient has his 

initial GI appointment scheduled for this Monday and was to begin the process 

to obtain approval to begin hepatitis C treatment.  Patient denies any fever or 

chills.  Patient denies any complaints of nausea or vomiting.  He reports she's 

had a good appetite.  He denies any diarrhea or constipation.  In the ED, 

patient is febrile with temperature 102.8.  White count is within normal 

limits.  LFTs are elevated.  Platelet count is 105.


INR is 1.5.  Chest x-ray shows bilateral pleural effusions right greater than 

left and a hazy opacity greatest in the right lung.  CT of the abdomen and 

pelvis shows bilateral pleural effusions and extensive ascites as well as 

nodular contour to the liver and splenomegaly suggestive of possible portal 

venous hypertension.


CBC/BMP:  


11/3/17 1705                                                                   

             11/3/17 1705





Significant Findings





Laboratory Tests








Test


  11/1/17


15:29 11/2/17


06:52 11/3/17


09:00 11/3/17


17:05


 


Blood Urea Nitrogen


  19 MG/DL


(7-18) 


  


  


 


 


Random Glucose


  113 MG/DL


() 


  


  


 


 


Calcium Level


  7.8 MG/DL


(8.5-10.1) 7.8 MG/DL


(8.5-10.1) 


  8.1 MG/DL


(8.5-10.1)


 


Sodium Level


  135 MEQ/L


(136-145) 


  


  135 MEQ/L


(136-145)


 


Potassium Level


  3.3 MEQ/L


(3.5-5.1) 3.1 MEQ/L


(3.5-5.1) 


  


 


 


Estimat Glomerular Filtration


Rate 73 ML/MIN


(>89) 68 ML/MIN


(>89) 


  56 ML/MIN


(>89)


 


Red Blood Count


  


  3.82 MIL/MM3


(4.50-5.90) 


  4.20 MIL/MM3


(4.50-5.90)


 


Hematocrit


  


  37.5 %


(39.0-51.0) 


  


 


 


Mean Corpuscular Hemoglobin


  


  34.1 PG


(27.0-34.0) 


  34.3 PG


(27.0-34.0)


 


Red Cell Distribution Width


  


  19.0 %


(11.6-17.2) 


  19.1 %


(11.6-17.2)


 


Platelet Count


  


  52 TH/MM3


(150-450) 


  46 TH/MM3


(150-450)


 


Monocytes %  11 % (0-8)   


 


Eosinophils %  6 % (0-4)   


 


Basophils %  5 % (0-2)   


 


Platelet Estimate  LOW (NORMAL)   


 


Prothrombin Time


  


  15.8 SEC


(9.8-11.6) 


  


 


 


Albumin


  


  2.3 GM/DL


(3.4-5.0) 


  2.9 GM/DL


(3.4-5.0)


 


Alkaline Phosphatase


  


  118 U/L


() 


  


 


 


Aspartate Amino Transf


(AST/SGOT) 


  91 U/L (15-37) 


  


  105 U/L


(15-37)


 


Total Bilirubin


  


  6.1 MG/DL


(0.2-1.0) 


  8.0 MG/DL


(0.2-1.0)


 


Total Iron Binding Capacity


  


  178 MCG/DL


(250-450) 


  


 


 


Percent Iron Saturation  55.1 % (20-50)   


 


Ferritin


  


  768 NG/ML


() 


  


 


 


Thyroid Stimulating Hormone


3rd Gen 


  8.730 uIU/ML


(0.358-3.740) 


  


 


 


Immunoglobulin A


  


  534 mg/dL


() 


  


 


 


Peritoneal Fluid WBC


  


  


  346 /MM3


(0-10) 


 


 


Peritoneal Fluid RBC   267 /MM3 (0-0)  


 


Monocytes (%) (Auto)


  


  


  


  18.2 %


(0.0-8.0)


 


Eosinophils (%) (Auto)


  


  


  


  5.5 %


(0.0-4.0)


 


Creatinine


  


  


  


  1.31 MG/DL


(0.60-1.30)








Imaging





Last Impressions








Cyst Biopsy Asp-Paracentesis US 11/3/17 0000 Signed





Impressions: 





 Service Date/Time:  Friday, November 3, 2017 08:40 - CONCLUSION:  

Uncomplicated 





 ultrasound guided paracentesis.       Job Canales MD 


 


Chest X-Ray 11/3/17 0000 Signed





Impressions: 





 Service Date/Time:  Friday, November 3, 2017 16:30 - CONCLUSION:  Findings as 





 above improved from 10/28/17     Kiko Tucker MD  FACR


 


Abdomen/Pelvis CT 10/28/17 0000 Signed





Impressions: 





 Service Date/Time:  Saturday, October 28, 2017 16:11 - CONCLUSION:  1. 

Bilateral 





 pleural effusions and extensive ascites 2. Nodular contour to the liver and 





 splenomegaly suggests possible portal venous hypertension     Cl Howell MD 








PE at Discharge


AAOx3


Clear lungs Bl


Bowel sounds present but distant - there is significant abdominal ascites


+1 edema in lower extremities


Pt update on day of discharge


The patient complained of bilateral calf pain which were tender to palpation.  

Venous Dopplers were ordered and negative for DVT.  The patient is clear to 

discharged


Pt Condition on Discharge:  Stable


Discharge Disposition:  Discharge Home


Discharge Time:  > 30 minutes


Discharge Instructions


DIET: Follow Instructions for:  Heart Healthy Diet, Low Sodium Diet


Activities you can perform:  Regular-No Restrictions


Activities to Avoid:  Prolonged Standing, Strenuous Activity


Follow up Referrals:  


PCP Follow-up - 2-3 Days





New Medications:  


Furosemide (Furosemide) 40 Mg Tab


40 MG PO BID for edema, #60 TAB 0 Refills





Phytonadione (Mephyton) 5 Mg Tab


5 MG PO DAILY for Alcohol Detox, #30 TAB





Potassium Phosphate-Sodium Phosphate (K-Phos Neutral) 155-852-130 Mg Tab


250 MG PO Q8HR for electrolyte abnormalities, #30 TAB





Spironolactone (Aldactone) 100 Mg Tab


100 MG PO BID@09,18 for edema, #62 TAB





[Lactulose Liq] () 30 ML SYRP


30 ML PO BID for prevent elevated ammonia, #1 BOTTLE 1 Refill





 


Continued Medications:  


Levothyroxine (Levothyroxine) 50 Mcg Tab


50 MCG PO DAILY for Thyroid, #30 TAB 0 Refills (This prescription has been 

renewed)





Pantoprazole Sod (Protonix) 40 Mg Tabdr


40 MG PO DAILY





 


Discontinued Medications:  


Levothyroxine Sodium (Levothroid) 75 Mcg Tab


75 MCG PO DAILY

















Neil Dooley MD Nov 4, 2017 11:13

## 2017-11-04 NOTE — HHI.GIFU
Subjective


Remarks


Pt walking around room. says leg swelling somewhat improved but still legs are 

tender.  Feeling better after paracentesis with 6.5L removed. 


 (Saundra Demarco)





Objective


Vitals I&O





Vital Signs








  Date Time  Temp Pulse Resp B/P (MAP) Pulse Ox O2 Delivery O2 Flow Rate FiO2


 


11/4/17 08:00 96.7 84 17 108/69 (82) 91   


 


11/4/17 06:26  85      


 


11/4/17 04:00 97.3 75 20 109/63 (78) 97   


 


11/4/17 00:00 98.6 83 20 103/61 (75) 94   


 


11/3/17 19:00 98.4 77 20 111/64 (80) 96   


 


11/3/17 16:00 96.8 83 16 93/65 (74) 94   














I/O      


 


 11/3/17 11/3/17 11/3/17 11/4/17 11/4/17 11/4/17





 07:00 15:00 23:00 07:00 15:00 23:00


 


Intake Total  250 ml 500 ml 340 ml  


 


Output Total 1375 ml 500 ml 1200 ml 900 ml  


 


Balance -1375 ml -250 ml -700 ml -560 ml  


 


      


 


Intake Oral   500 ml 240 ml  


 


IV Total  250 ml  100 ml  


 


Output Urine Total 1375 ml 500 ml 1200 ml 900 ml  


 


# Bowel Movements   0   








Laboratory





Laboratory Tests








Test


  11/3/17


17:05


 


White Blood Count 4.7 


 


Red Blood Count 4.20 


 


Hemoglobin 14.4 


 


Hematocrit 41.7 


 


Mean Corpuscular Volume 99.3 


 


Mean Corpuscular Hemoglobin 34.3 


 


Mean Corpuscular Hemoglobin


Concent 34.6 


 


 


Red Cell Distribution Width 19.1 


 


Platelet Count 46 


 


Mean Platelet Volume 9.8 


 


Neutrophils (%) (Auto) 47.9 


 


Lymphocytes (%) (Auto) 28.0 


 


Monocytes (%) (Auto) 18.2 


 


Eosinophils (%) (Auto) 5.5 


 


Basophils (%) (Auto) 0.4 


 


Neutrophils # (Auto) 2.3 


 


Lymphocytes # (Auto) 1.3 


 


Monocytes # (Auto) 0.9 


 


Eosinophils # (Auto) 0.3 


 


Basophils # (Auto) 0.0 


 


CBC Comment AUTO DIFF 


 


Differential Comment


  AUTO DIFF


CONFIRMED


 


Blood Urea Nitrogen 16 


 


Creatinine 1.31 


 


Random Glucose 95 


 


Total Protein 7.6 


 


Albumin 2.9 


 


Calcium Level 8.1 


 


Alkaline Phosphatase 117 


 


Aspartate Amino Transf


(AST/SGOT) 105 


 


 


Alanine Aminotransferase


(ALT/SGPT) 49 


 


 


Total Bilirubin 8.0 


 


Sodium Level 135 


 


Potassium Level 3.7 


 


Chloride Level 98 


 


Carbon Dioxide Level 28.5 


 


Anion Gap 9 


 


Estimat Glomerular Filtration


Rate 56 


 














 Date/Time


Source Procedure


Growth Status


 


 


 10/28/17 15:10


Blood Peripheral Aerobic Blood Culture - Final


NO GROWTH IN 5 DAYS Complete


 


 10/28/17 15:10


Blood Peripheral Anaerobic Blood Culture - Final


NO GROWTH IN 5 DAYS Complete





 11/3/17 09:00


Fluid Peritoneal Fluid Gram Stain - Final Resulted


 


 11/3/17 09:00


Fluid Peritoneal Fluid Body Fluid Culture


Pending Resulted








Imaging





Last Impressions








Cyst Biopsy Asp-Paracentesis US 11/3/17 0000 Signed





Impressions: 





 Service Date/Time:  Friday, November 3, 2017 08:40 - CONCLUSION:  

Uncomplicated 





 ultrasound guided paracentesis.       Job Canales MD 


 


Chest X-Ray 11/3/17 0000 Signed





Impressions: 





 Service Date/Time:  Friday, November 3, 2017 16:30 - CONCLUSION:  Findings as 





 above improved from 10/28/17     Kiko Tucker MD  FACR


 


Abdomen/Pelvis CT 10/28/17 0000 Signed





Impressions: 





 Service Date/Time:  Saturday, October 28, 2017 16:11 - CONCLUSION:  1. 

Bilateral 





 pleural effusions and extensive ascites 2. Nodular contour to the liver and 





 splenomegaly suggests possible portal venous hypertension     Cl Howell MD 








Physical Exam


HEENT:Normocephalic; atraumatic; +icterus


CHEST:  CTA


CARDIAC:  RRR


ABDOMEN: soft, distended, nontender; no hepatosplenomegaly; bowel sounds are 

present in all four quadrants.


EXTREMITIES: +2 BLE edema


SKIN:  Normal; no rash; no jaundice.


CNS:  No focal deficits; alert and oriented times three.


 (Saundra Demarco [facility])





Assessment and Plan


Plan


ASSESSMENT:


- New onset ascites.  Worsening abdominal swelling/ble edema over the past 

month.  S/P Paracentesis (10/29).  Peritoneal fluid WBC 2562, 


   . Cx no growth 48h     s/p paracentesis, 6.5L removed.  


- Liver cirrhosis, as evidenced by ascites, splenomegaly, coagulopathy/

thrombocytopenia, hypoalbuminemia, and nodular appearance


   of the liver on CT scan.  Abdomen CT (10/28/17)--1. Bilateral pleural 

effusions and extensive ascites 2. Nodular contour to the liver and 


  splenomegaly suggests possible portal venous hypertension.  Iron 98, TIBC 178L

, %sat 55H, ferritin 708.  bilirubin increasing, now with icterus  


- Hepatitis C, recent diagnosis. genotype 1a, quant 775,000.


- Thrombocytopenia/Coagulopathy.  


- Bilateral pleural effusions, dyspnea. CT as above. Shortness of breath 

improved s/p paracentesis. 


- Hypertension, hypothyroidism per attending. 


 


    


PLAN:


- low sodium diet


- fluid restriction 


- continue diuretics


 - Monitor labs


- Supportive care


- f/u with GI as outpatient for hcv tx





This pt seen by myself and Dr Medina and this note is written on his behalf 


 (Saundra Demarco)


Physician Comments


Seen with deng Ivy as above.


Further recommendations to follow.


 (Kodak Medina MD)











Saundra Demarco Nov 4, 2017 12:29


Kodak Medina MD Nov 4, 2017 16:14

## 2017-11-04 NOTE — HHI.DS
__________________________________________________





Discharge Summary


Admission Date


Oct 28, 2017 at 17:00


Discharge Date:  Nov 4, 2017


Admitting Diagnosis





anasarca,pleural effusion, liver failure, fever





(1) Liver cirrhosis


ICD Code:  K74.60 - Unspecified cirrhosis of liver


Status:  Acute


(2) Anasarca


ICD Code:  R60.1 - Generalized edema


Status:  Acute


(3) Ascites


ICD Code:  R18.8 - Other ascites


Status:  Acute


(4) Hepatitis C


ICD Code:  B19.20 - Unspecified viral hepatitis C without hepatic coma


Status:  Acute


(5) Thrombocytopenia


ICD Code:  D69.6 - Thrombocytopenia, unspecified


Status:  Acute


(6) Coagulopathy


ICD Code:  D68.9 - Coagulation defect, unspecified


Status:  Acute


(7) Pleural effusion associated with hepatic disorder


ICD Code:  K76.9 - Liver disease, unspecified; J91.8 - Pleural effusion in 

other conditions classified elsewhere


(8) HTN (hypertension)


ICD Code:  I10 - Essential (primary) hypertension


(9) Hypothyroidism


ICD Code:  E03.9 - Hypothyroidism, unspecified


(10) Hypokalemia


ICD Code:  E87.6 - Hypokalemia


Status:  Acute


Procedures


Ultrasound-guided abdominal paracentesis.


Brief History - From Admission


Written by Shraddha Foreman, acting as scribe for Dr. Limon on 10/28/17 at 17:

47. 





This is a 56-year-old male with a past medical history significant for alcohol 

abuse, hepatitis C untreated, hypertension and hypothyroidism who presents to 

[facility] ED with complaints of progressive swelling in his abdomen for 

the past 4 weeks.  Patient admits to history of heavy alcohol use and reports 

his last drink was 4-5 days after Labor Day.  Since that time, patient states 

she's been experiencing increased swelling in his abdomen as well as in his 

lower extremities.  About 2-3 weeks ago he began having progressive abdominal 

pain.  His daughter who is at the bedside states that she began to notice his 

eyes turning yellow around Father's Day of this year.  He is having a difficult 

time breathing.  He has begun to have some swelling in his scrotum.  He 

endorses being more tired and sleeping most of the day.  Patient has his 

initial GI appointment scheduled for this Monday and was to begin the process 

to obtain approval to begin hepatitis C treatment.  Patient denies any fever or 

chills.  Patient denies any complaints of nausea or vomiting.  He reports she's 

had a good appetite.  He denies any diarrhea or constipation.  In the ED, 

patient is febrile with temperature 102.8.  White count is within normal 

limits.  LFTs are elevated.  Platelet count is 105.


INR is 1.5.  Chest x-ray shows bilateral pleural effusions right greater than 

left and a hazy opacity greatest in the right lung.  CT of the abdomen and 

pelvis shows bilateral pleural effusions and extensive ascites as well as 

nodular contour to the liver and splenomegaly suggestive of possible portal 

venous hypertension.


CBC/BMP:  


11/3/17 1705                                                                   

             11/3/17 1705





Significant Findings





Laboratory Tests








Test


  11/1/17


15:29 11/2/17


06:52 11/3/17


09:00 11/3/17


17:05


 


Blood Urea Nitrogen


  19 MG/DL


(7-18) 


  


  


 


 


Random Glucose


  113 MG/DL


() 


  


  


 


 


Calcium Level


  7.8 MG/DL


(8.5-10.1) 7.8 MG/DL


(8.5-10.1) 


  8.1 MG/DL


(8.5-10.1)


 


Sodium Level


  135 MEQ/L


(136-145) 


  


  135 MEQ/L


(136-145)


 


Potassium Level


  3.3 MEQ/L


(3.5-5.1) 3.1 MEQ/L


(3.5-5.1) 


  


 


 


Estimat Glomerular Filtration


Rate 73 ML/MIN


(>89) 68 ML/MIN


(>89) 


  56 ML/MIN


(>89)


 


Red Blood Count


  


  3.82 MIL/MM3


(4.50-5.90) 


  4.20 MIL/MM3


(4.50-5.90)


 


Hematocrit


  


  37.5 %


(39.0-51.0) 


  


 


 


Mean Corpuscular Hemoglobin


  


  34.1 PG


(27.0-34.0) 


  34.3 PG


(27.0-34.0)


 


Red Cell Distribution Width


  


  19.0 %


(11.6-17.2) 


  19.1 %


(11.6-17.2)


 


Platelet Count


  


  52 TH/MM3


(150-450) 


  46 TH/MM3


(150-450)


 


Monocytes %  11 % (0-8)   


 


Eosinophils %  6 % (0-4)   


 


Basophils %  5 % (0-2)   


 


Platelet Estimate  LOW (NORMAL)   


 


Prothrombin Time


  


  15.8 SEC


(9.8-11.6) 


  


 


 


Albumin


  


  2.3 GM/DL


(3.4-5.0) 


  2.9 GM/DL


(3.4-5.0)


 


Alkaline Phosphatase


  


  118 U/L


() 


  


 


 


Aspartate Amino Transf


(AST/SGOT) 


  91 U/L (15-37) 


  


  105 U/L


(15-37)


 


Total Bilirubin


  


  6.1 MG/DL


(0.2-1.0) 


  8.0 MG/DL


(0.2-1.0)


 


Total Iron Binding Capacity


  


  178 MCG/DL


(250-450) 


  


 


 


Percent Iron Saturation  55.1 % (20-50)   


 


Ferritin


  


  768 NG/ML


() 


  


 


 


Thyroid Stimulating Hormone


3rd Gen 


  8.730 uIU/ML


(0.358-3.740) 


  


 


 


Immunoglobulin A


  


  534 mg/dL


() 


  


 


 


Peritoneal Fluid WBC


  


  


  346 /MM3


(0-10) 


 


 


Peritoneal Fluid RBC   267 /MM3 (0-0)  


 


Monocytes (%) (Auto)


  


  


  


  18.2 %


(0.0-8.0)


 


Eosinophils (%) (Auto)


  


  


  


  5.5 %


(0.0-4.0)


 


Creatinine


  


  


  


  1.31 MG/DL


(0.60-1.30)








Imaging





Last Impressions








Cyst Biopsy Asp-Paracentesis US 11/3/17 0000 Signed





Impressions: 





 Service Date/Time:  Friday, November 3, 2017 08:40 - CONCLUSION:  

Uncomplicated 





 ultrasound guided paracentesis.       Job Canales MD 


 


Chest X-Ray 11/3/17 0000 Signed





Impressions: 





 Service Date/Time:  Friday, November 3, 2017 16:30 - CONCLUSION:  Findings as 





 above improved from 10/28/17     Kiko Tucker MD  FACR


 


Abdomen/Pelvis CT 10/28/17 0000 Signed





Impressions: 





 Service Date/Time:  Saturday, October 28, 2017 16:11 - CONCLUSION:  1. 

Bilateral 





 pleural effusions and extensive ascites 2. Nodular contour to the liver and 





 splenomegaly suggests possible portal venous hypertension     Cl Howell MD 








PE at Discharge


AAOx3


Clear lungs Bl


Bowel sounds present but distant - there is significant abdominal ascites


+1 edema in lower extremities


Pt update on day of discharge


The patient complained of bilateral calf pain which were tender to palpation.  

Venous Dopplers were ordered and negative for DVT.  The patient is clear to 

discharged


Pt Condition on Discharge:  Stable


Discharge Disposition:  Discharge Home


Discharge Time:  > 30 minutes


Discharge Instructions


DIET: Follow Instructions for:  Heart Healthy Diet, Low Sodium Diet


Activities you can perform:  Regular-No Restrictions


Activities to Avoid:  Prolonged Standing, Strenuous Activity


Follow up Referrals:  


PCP Follow-up - 2-3 Days





New Medications:  


Furosemide (Furosemide) 40 Mg Tab


40 MG PO BID for edema, #60 TAB 0 Refills





Phytonadione (Mephyton) 5 Mg Tab


5 MG PO DAILY for Alcohol Detox, #30 TAB





Potassium Phosphate-Sodium Phosphate (K-Phos Neutral) 155-852-130 Mg Tab


250 MG PO Q8HR for electrolyte abnormalities, #30 TAB





Spironolactone (Aldactone) 100 Mg Tab


100 MG PO BID@09,18 for edema, #62 TAB





[Lactulose Liq] () 30 ML SYRP


30 ML PO BID for prevent elevated ammonia, #1 BOTTLE 1 Refill





 


Continued Medications:  


Levothyroxine (Levothyroxine) 50 Mcg Tab


50 MCG PO DAILY for Thyroid, #30 TAB 0 Refills (This prescription has been 

renewed)





Pantoprazole Sod (Protonix) 40 Mg Tabdr


40 MG PO DAILY





 


Discontinued Medications:  


Levothyroxine Sodium (Levothroid) 75 Mcg Tab


75 MCG PO DAILY

















Neil Dooley MD Nov 4, 2017 11:13

## 2018-03-28 ENCOUNTER — HOSPITAL ENCOUNTER (OUTPATIENT)
Dept: HOSPITAL 17 - NEPC | Age: 58
Setting detail: OBSERVATION
LOS: 3 days | Discharge: HOME | End: 2018-03-31
Attending: HOSPITALIST | Admitting: HOSPITALIST
Payer: COMMERCIAL

## 2018-03-28 VITALS — BODY MASS INDEX: 27.78 KG/M2 | WEIGHT: 198.42 LBS | HEIGHT: 71 IN

## 2018-03-28 VITALS
SYSTOLIC BLOOD PRESSURE: 130 MMHG | HEART RATE: 86 BPM | DIASTOLIC BLOOD PRESSURE: 65 MMHG | TEMPERATURE: 98.5 F | RESPIRATION RATE: 20 BRPM | OXYGEN SATURATION: 95 %

## 2018-03-28 VITALS
OXYGEN SATURATION: 95 % | SYSTOLIC BLOOD PRESSURE: 133 MMHG | HEART RATE: 82 BPM | DIASTOLIC BLOOD PRESSURE: 76 MMHG | TEMPERATURE: 98.3 F | RESPIRATION RATE: 22 BRPM

## 2018-03-28 VITALS
HEART RATE: 87 BPM | SYSTOLIC BLOOD PRESSURE: 116 MMHG | OXYGEN SATURATION: 95 % | RESPIRATION RATE: 18 BRPM | DIASTOLIC BLOOD PRESSURE: 68 MMHG

## 2018-03-28 DIAGNOSIS — K76.6: ICD-10-CM

## 2018-03-28 DIAGNOSIS — R10.9: ICD-10-CM

## 2018-03-28 DIAGNOSIS — J98.11: ICD-10-CM

## 2018-03-28 DIAGNOSIS — R06.01: ICD-10-CM

## 2018-03-28 DIAGNOSIS — R18.8: ICD-10-CM

## 2018-03-28 DIAGNOSIS — M79.661: ICD-10-CM

## 2018-03-28 DIAGNOSIS — M79.89: ICD-10-CM

## 2018-03-28 DIAGNOSIS — M79.662: ICD-10-CM

## 2018-03-28 DIAGNOSIS — D69.6: ICD-10-CM

## 2018-03-28 DIAGNOSIS — B19.20: ICD-10-CM

## 2018-03-28 DIAGNOSIS — E03.9: ICD-10-CM

## 2018-03-28 DIAGNOSIS — K74.60: ICD-10-CM

## 2018-03-28 DIAGNOSIS — R94.31: ICD-10-CM

## 2018-03-28 DIAGNOSIS — J90: Primary | ICD-10-CM

## 2018-03-28 LAB
ALBUMIN SERPL-MCNC: 2.3 GM/DL (ref 3.4–5)
ALP SERPL-CCNC: 103 U/L (ref 45–117)
ALT SERPL-CCNC: 41 U/L (ref 12–78)
AST SERPL-CCNC: 83 U/L (ref 15–37)
BASOPHILS # BLD AUTO: 0 TH/MM3 (ref 0–0.2)
BASOPHILS NFR BLD: 0.6 % (ref 0–2)
BILIRUB INDIRECT SERPL-MCNC: 1.9 MG/DL (ref 0–0.8)
BILIRUB SERPL-MCNC: 2.8 MG/DL (ref 0.2–1)
BUN SERPL-MCNC: 22 MG/DL (ref 7–18)
CALCIUM SERPL-MCNC: 8.4 MG/DL (ref 8.5–10.1)
CHLORIDE SERPL-SCNC: 103 MEQ/L (ref 98–107)
CREAT SERPL-MCNC: 1.29 MG/DL (ref 0.6–1.3)
DIRECT BILIRUBIN ADULT: 0.9 MG/DL (ref 0–0.2)
EOSINOPHIL # BLD: 0.3 TH/MM3 (ref 0–0.4)
EOSINOPHIL NFR BLD: 5.4 % (ref 0–4)
ERYTHROCYTE [DISTWIDTH] IN BLOOD BY AUTOMATED COUNT: 14.2 % (ref 11.6–17.2)
GFR SERPLBLD BASED ON 1.73 SQ M-ARVRAT: 57 ML/MIN (ref 89–?)
GLUCOSE SERPL-MCNC: 88 MG/DL (ref 74–106)
HCO3 BLD-SCNC: 23.2 MEQ/L (ref 21–32)
HCT VFR BLD CALC: 36.2 % (ref 39–51)
HGB BLD-MCNC: 12.6 GM/DL (ref 13–17)
INR PPP: 1.4 RATIO
LYMPHOCYTES # BLD AUTO: 0.9 TH/MM3 (ref 1–4.8)
LYMPHOCYTES NFR BLD AUTO: 17.6 % (ref 9–44)
MAGNESIUM SERPL-MCNC: 1.8 MG/DL (ref 1.5–2.5)
MCH RBC QN AUTO: 33 PG (ref 27–34)
MCHC RBC AUTO-ENTMCNC: 34.9 % (ref 32–36)
MCV RBC AUTO: 94.5 FL (ref 80–100)
MONOCYTE #: 1.1 TH/MM3 (ref 0–0.9)
MONOCYTES NFR BLD: 21.5 % (ref 0–8)
NEUTROPHILS # BLD AUTO: 2.9 TH/MM3 (ref 1.8–7.7)
NEUTROPHILS NFR BLD AUTO: 54.9 % (ref 16–70)
PLATELET # BLD: 71 TH/MM3 (ref 150–450)
PMV BLD AUTO: 8.3 FL (ref 7–11)
PROT SERPL-MCNC: 7.4 GM/DL (ref 6.4–8.2)
PROTHROMBIN TIME: 13.8 SEC (ref 9.8–11.6)
RBC # BLD AUTO: 3.83 MIL/MM3 (ref 4.5–5.9)
SODIUM SERPL-SCNC: 134 MEQ/L (ref 136–145)
TROPONIN I SERPL-MCNC: (no result) NG/ML (ref 0.02–0.05)
WBC # BLD AUTO: 5.2 TH/MM3 (ref 4–11)

## 2018-03-28 PROCEDURE — 96376 TX/PRO/DX INJ SAME DRUG ADON: CPT

## 2018-03-28 PROCEDURE — 71250 CT THORAX DX C-: CPT

## 2018-03-28 PROCEDURE — 80053 COMPREHEN METABOLIC PANEL: CPT

## 2018-03-28 PROCEDURE — 86850 RBC ANTIBODY SCREEN: CPT

## 2018-03-28 PROCEDURE — 96374 THER/PROPH/DIAG INJ IV PUSH: CPT

## 2018-03-28 PROCEDURE — 83615 LACTATE (LD) (LDH) ENZYME: CPT

## 2018-03-28 PROCEDURE — 80076 HEPATIC FUNCTION PANEL: CPT

## 2018-03-28 PROCEDURE — C1729 CATH, DRAINAGE: HCPCS

## 2018-03-28 PROCEDURE — 85730 THROMBOPLASTIN TIME PARTIAL: CPT

## 2018-03-28 PROCEDURE — 87205 SMEAR GRAM STAIN: CPT

## 2018-03-28 PROCEDURE — P9047 ALBUMIN (HUMAN), 25%, 50ML: HCPCS

## 2018-03-28 PROCEDURE — 87070 CULTURE OTHR SPECIMN AEROBIC: CPT

## 2018-03-28 PROCEDURE — 86901 BLOOD TYPING SEROLOGIC RH(D): CPT

## 2018-03-28 PROCEDURE — 84484 ASSAY OF TROPONIN QUANT: CPT

## 2018-03-28 PROCEDURE — 99285 EMERGENCY DEPT VISIT HI MDM: CPT

## 2018-03-28 PROCEDURE — 80048 BASIC METABOLIC PNL TOTAL CA: CPT

## 2018-03-28 PROCEDURE — 84132 ASSAY OF SERUM POTASSIUM: CPT

## 2018-03-28 PROCEDURE — 85610 PROTHROMBIN TIME: CPT

## 2018-03-28 PROCEDURE — 84157 ASSAY OF PROTEIN OTHER: CPT

## 2018-03-28 PROCEDURE — 87522 HEPATITIS C REVRS TRNSCRPJ: CPT

## 2018-03-28 PROCEDURE — 89051 BODY FLUID CELL COUNT: CPT

## 2018-03-28 PROCEDURE — G0378 HOSPITAL OBSERVATION PER HR: HCPCS

## 2018-03-28 PROCEDURE — 32555 ASPIRATE PLEURA W/ IMAGING: CPT

## 2018-03-28 PROCEDURE — 82945 GLUCOSE OTHER FLUID: CPT

## 2018-03-28 PROCEDURE — 82948 REAGENT STRIP/BLOOD GLUCOSE: CPT

## 2018-03-28 PROCEDURE — 76700 US EXAM ABDOM COMPLETE: CPT

## 2018-03-28 PROCEDURE — 83880 ASSAY OF NATRIURETIC PEPTIDE: CPT

## 2018-03-28 PROCEDURE — 85025 COMPLETE CBC W/AUTO DIFF WBC: CPT

## 2018-03-28 PROCEDURE — 71046 X-RAY EXAM CHEST 2 VIEWS: CPT

## 2018-03-28 PROCEDURE — 83735 ASSAY OF MAGNESIUM: CPT

## 2018-03-28 PROCEDURE — 71045 X-RAY EXAM CHEST 1 VIEW: CPT

## 2018-03-28 PROCEDURE — 82140 ASSAY OF AMMONIA: CPT

## 2018-03-28 PROCEDURE — 86900 BLOOD TYPING SEROLOGIC ABO: CPT

## 2018-03-28 PROCEDURE — 93005 ELECTROCARDIOGRAM TRACING: CPT

## 2018-03-28 NOTE — PD
HPI


Chief Complaint:  Respiratory Symptoms


Time Seen by Provider:  19:59


Travel History


International Travel<30 days:  No


Contact w/Intl Traveler<30days:  No


Traveled to known affect area:  No





History of Present Illness


HPI


57-year-old male presents to the emergency department by private transportation 

for complaint of shortness of breath.  Symptoms been slowly worsening over the 

past 6 weeks.  More so acutely over the past 24 hours.  Patient noticed 

increased dyspnea at rest on exertion orthopnea without PND.  Patient is also 

noted some increasing abdominal girth and some mild swelling of the lower 

extremities.  Patient has history of cirrhosis and is currently under the care 

of Dr. Morrison for hepatitis C.  Patient denies tobacco use alcohol use.  

Patient was admitted in October 2017 with ascites and pleural effusion 

requiring thoracentesis.  Patient denies any other concerns or complaints.  

Patient denies fever chills.  Patient denies chest pain.  Patient rates overall 

discomfort mild to moderate.  Patient denies any epistaxis, gingival bleeding, 

hemoptysis, hematemesis, coffee-ground emesis, melena hematochezia hematuria or 

increased bruising.  Patient denies any long-distance travel, protracted bedrest

, or surgical procedure.





Formerly [facility], later [facility]


Past Medical History


*** Narrative Medical


Cirrhosis, hepatitis C, pleural effusion, ascites status post paracentesis; 

tonsillectomy, ganglion cyst excision; no tobacco use no alcohol use; nursing 

notes reviewed


Cancer:  No


Cardiovascular Problems:  No


Diabetes:  No


Endocrine:  Yes ("CIRRHOSIS")


Gastrointestinal Disorders:  No


Genitourinary:  No


Implanted Vascular Access Dvce:  No


Musculoskeletal:  No


Neurologic:  No


Psychiatric:  No


Reproductive:  No


Respiratory:  No


Thyroid Disease:  Yes ("HYPOTHYRODISM")





Past Surgical History


Pacemaker:  No


Tonsillectomy:  Yes


Other Surgery:  Yes (GANGLION CYST LEFT WRIST )





Social History


Alcohol Use:  No ("NOT ANYMORE")


Tobacco Use:  No


Substance Use:  No





Allergies-Medications


(Allergen,Severity, Reaction):  


Coded Allergies:  


     No Known Allergies (Unverified  Allergy, Unknown, 3/29/18)


Reported Meds & Prescriptions





Reported Meds & Active Scripts


Active


Ciprofloxacin (Ciprofloxacin HCl) 500 Mg Tab 500 Mg PO BID


Aldactone (Spironolactone) 100 Mg Tab 100 Mg PO BID@09,18


Furosemide 40 Mg Tab 40 Mg PO BID


K-Phos Neutral (Potassium Phos/Sodium Phos) 155-852-130 Mg Tab 250 Mg PO Q8HR


[Lactulose Liq] 30 ML Syrp 30 Ml PO BID


Mephyton (Phytonadione) 5 Mg Tab 5 Mg PO DAILY


Levothyroxine (Levothyroxine Sodium) 50 Mcg Tab 50 Mcg PO DAILY


Reported


Protonix (Pantoprazole Sodium) 40 Mg Tabdr 40 Mg PO DAILY








Review of Systems


Except as stated in HPI:  all other systems reviewed are Neg


General / Constitutional:  No: Fever, Chills


HENT:  No: Congestion, Nosebleed, Gingival Bleeding


Cardiovascular:  No: Chest Pain or Discomfort


Respiratory:  Positive: Shortness of Breath, Orthopnea, No: Hemoptysis


Gastrointestinal:  No: Nausea, Vomiting, Abdominal Pain, Hematemesis, 

Hematochezia


Genitourinary:  No: Hematuria


Musculoskeletal:  No: Pain


Skin:  No Rash


Neurologic:  No: Weakness


Psychiatric:  No: Anxiety


Hematologic/Lymphatic:  No: Easy Bruising





Physical Exam


Narrative


GENERAL: Well-developed well-nourished male in no acute distress mild 

respiratory distress sitting at rest.


SKIN: Warm and dry.


HEAD: Normocephalic.


EYES: No scleral icterus. No injection or drainage. 


NECK: Supple, trachea midline. No JVD or lymphadenopathy.


CARDIOVASCULAR: Regular rate and rhythm without murmurs, gallops, or rubs. 


RESPIRATORY: Breath sounds equal bilaterally breath sounds audible but 

diminished to auscultation throughout the right lung field breath sounds heard 

throughout the left hemithorax. No accessory muscle use.


GASTROINTESTINAL: Abdomen soft, non-tender, mild non-tense, but distended. 


MUSCULOSKELETAL: No cyanosis, 1+ pedal edema bilaterally.  Radial and dorsalis 

pedis pulses 2+ to palpation bilaterally with brisk capillary refill less than 

2 seconds per digit.


BACK: Nontender without obvious deformity. No CVA tenderness.








Data


Data


Last Documented VS





Vital Signs








  Date Time  Temp Pulse Resp B/P (MAP) Pulse Ox O2 Delivery O2 Flow Rate FiO2


 


3/28/18 19:54 98.3 82 22 133/76 (95) 95 Room Air  








Orders





 Orders


Complete Blood Count With Diff (3/28/18 18:19)


Basic Metabolic Panel (Bmp) (3/28/18 18:19)


Chest, Pa & Lat (3/28/18 18:19)


Electrocardiogram (3/28/18 18:19)


B-Type Natriuretic Peptide (3/28/18 20:08)


Act Partial Throm Time (Ptt) (3/28/18 20:08)


Prothrombin Time / Inr (Pt) (3/28/18 20:08)


Magnesium (Mg) (3/28/18 20:08)


Troponin I (3/28/18 20:08)


Urinalysis - C+S If Indicated (3/28/18 20:08)


Iv Access Insert/Monitor (3/28/18 20:08)


Ecg Monitoring (3/28/18 20:08)


Oximetry (3/28/18 20:08)


Ammonia (3/28/18 20:08)


Type And Screen (3/28/18 20:08)


Hepatic Functional Panel (3/28/18 20:08)


Sodium Polysty Sulfate Liq (Kayexalate L (3/28/18 23:00)


Potassium, Serum (K) (3/28/18 22:46)


Ct Thorax/ Chest Wo Iv Contras (3/28/18 )


Admit Order (Ed Use Only) (3/28/18 )


Cardiac Monitor / Telemetry ALONZO.Q8H (3/28/18 23:26)


Activity Oob With Assistance (3/28/18 23:26)


Notify Dr: Other (3/28/18 23:26)





Labs





Laboratory Tests








Test


  3/28/18


20:05 3/28/18


20:15 3/28/18


20:28 3/28/18


22:18


 


B-Type Natriuretic Peptide 47 PG/ML    


 


Prothrombin Time  13.8 SEC   


 


Prothromb Time International


Ratio 


  1.4 RATIO 


  


  


 


 


Activated Partial


Thromboplast Time 


  30.0 SEC 


  


  


 


 


Blood Urea Nitrogen  22 MG/DL   


 


Creatinine  1.29 MG/DL   


 


Random Glucose  88 MG/DL   


 


Calcium Level  8.4 MG/DL   


 


Sodium Level  134 MEQ/L   


 


Potassium Level  5.3 MEQ/L   


 


Chloride Level  103 MEQ/L   


 


Carbon Dioxide Level  23.2 MEQ/L   


 


Anion Gap  8 MEQ/L   


 


Estimat Glomerular Filtration


Rate 


  57 ML/MIN 


  


  


 


 


Magnesium Level  1.8 MG/DL   


 


Total Bilirubin  2.8 MG/DL   


 


Direct Bilirubin  0.9 MG/DL   


 


Indirect Bilirubin  1.9 MG/DL   


 


Aspartate Amino Transf


(AST/SGOT) 


  83 U/L 


  


  


 


 


Alanine Aminotransferase


(ALT/SGPT) 


  41 U/L 


  


  


 


 


Alkaline Phosphatase  103 U/L   


 


Troponin I


  


  LESS THAN 0.02


NG/ML 


  


 


 


Total Protein  7.4 GM/DL   


 


Albumin  2.3 GM/DL   


 


Ammonia   23 MCMOL/L  


 


White Blood Count    5.2 TH/MM3 


 


Red Blood Count    3.83 MIL/MM3 


 


Hemoglobin    12.6 GM/DL 


 


Hematocrit    36.2 % 


 


Mean Corpuscular Volume    94.5 FL 


 


Mean Corpuscular Hemoglobin    33.0 PG 


 


Mean Corpuscular Hemoglobin


Concent 


  


  


  34.9 % 


 


 


Red Cell Distribution Width    14.2 % 


 


Platelet Count    71 TH/MM3 


 


Mean Platelet Volume    8.3 FL 


 


Neutrophils (%) (Auto)    54.9 % 


 


Lymphocytes (%) (Auto)    17.6 % 


 


Monocytes (%) (Auto)    21.5 % 


 


Eosinophils (%) (Auto)    5.4 % 


 


Basophils (%) (Auto)    0.6 % 


 


Neutrophils # (Auto)    2.9 TH/MM3 


 


Lymphocytes # (Auto)    0.9 TH/MM3 


 


Monocytes # (Auto)    1.1 TH/MM3 


 


Eosinophils # (Auto)    0.3 TH/MM3 


 


Basophils # (Auto)    0.0 TH/MM3 


 


CBC Comment    AUTO DIFF 


 


Differential Comment


  


  


  


  AUTO DIFF


CONFIRMED


 


Platelet Estimate    LOW 


 


Platelet Morphology Comment    NORMAL 


 


Red Cell Morphology Comment    NORMAL 











MDM


Medical Decision Making


Medical Screen Exam Complete:  Yes


Emergency Medical Condition:  Yes


Medical Record Reviewed:  Yes


Interpretation(s)


EKG: Normal sinus rhythm rate 88 no acute ST elevation or injury pattern LVH by 

voltage criteria


Last Impressions








Chest X-Ray 3/28/18 1819 Signed





Impressions: 





 Service Date/Time:  Wednesday, March 28, 2018 18:27 - CONCLUSION:  Total 





 opacification of the right hemithorax related to either a large effusion and/

or 





 consolidation/atelectasis     Leonard Rich MD 





CBC & BMP Diagram


3/28/18 20:15








Calcium Level 8.4 L





3/28/18 22:18











Vital Signs








  Date Time  Temp Pulse Resp B/P (MAP) Pulse Ox O2 Delivery O2 Flow Rate FiO2


 


3/28/18 19:54 98.3 82 22 133/76 (95) 95 Room Air  


 


3/28/18 18:11 98.5 86 20 130/65 (86) 95   








Differential Diagnosis


Dyspnea, pneumonia, effusion, mass, ascites, chf, coagulopathy; also to 

consider but unlikely pneumothorax


Narrative Course


Patient placed on cardiac monitor with continuous pulse oximetry; specimens 

collected and sent for resulting along with imaging studies


Chest x-ray PA and lateral performed in triage which shows complete 

opacification of the right hemithorax review of medical records identifies 

patient admitted October 2017 with pleural effusion and ascites requiring 

paracentesis without requiring thoracentesis ascitic fluid negative for 

malignancy or bacteria.





Physician Communication


Physician Communication


discussed with Dr Rivera





Diagnosis





 Primary Impression:  


 Pleural effusion associated with hepatic disorder


 Additional Impressions:  


 Liver cirrhosis


 Thrombocytopenia


 Hepatitis C





Admitting Information


Admitting Physician Requests:  Observation











Wanda Le MD Mar 28, 2018 20:08

## 2018-03-28 NOTE — RADRPT
EXAM DATE/TIME:  03/28/2018 18:27 

 

[facility] COMPARISON:     

CHEST SINGLE AP, November 03, 2017, 16:30.  CHEST PA & LAT, November 01, 2017, 16:23.

 

                     

INDICATIONS :     

Patient states shortness of breath.

                     

 

MEDICAL HISTORY :            

Hypothyroidism. Liver disease   

 

SURGICAL HISTORY :        

Tonsillectomy. Ganglion cyst removed from left wrist. Paracentesis

 

ENCOUNTER:     

Initial                                        

 

ACUITY:     

3 days      

 

PAIN SCORE:     

0/10

 

LOCATION:     

Bilateral chest 

 

FINDINGS:     

There is total opacification of the right hemithorax. The left lung is clear. The right heart border 
is obscured. The left heart does not appear enlarged.

 

CONCLUSION:    

Total opacification of the right hemithorax related to either a large effusion and/or consolidation/a
telectasis

 

 

 

 Leonard Rich MD on March 28, 2018 at 18:58           

Board Certified Radiologist.

 This report was verified electronically.

## 2018-03-28 NOTE — RADRPT
EXAM DATE/TIME:  03/28/2018 23:39 

 

[facility] COMPARISON:     

CHEST PA & LAT, March 28, 2018, 18:27.

 

 

INDICATIONS :     

Shortness of breath.

                      

 

RADIATION DOSE:     

9.29 CTDIvol (mGy) 

 

 

MEDICAL HISTORY :     

Cirrhosis.   

 

SURGICAL HISTORY :      

None.  

 

ENCOUNTER:      

Initial

 

ACUITY:      

1 month

 

PAIN SCALE:      

0/10

 

LOCATION:       

Bilateral chest 

 

TECHNIQUE:      

Volumetric scanning of the chest was performed.  Using automated exposure control and adjustment of t
he mA and/or kV according to patient size, radiation dose was kept as low as reasonably achievable to
 obtain optimal diagnostic quality images.  DICOM format image data is available electronically for r
eview and comparison.  

 

Follow-up recommendations for detected pulmonary nodules are based at a minimum on nodule size and pa
tient risk factors according to Fleischner Society Guidelines.

 

FINDINGS:     

 

LUNGS:     

There is atelectasis of the entire right lung secondary to a large right pleural effusion which compl
etely opacifies the right hemithorax.

 

PLEURAE:     

There is no pleural thickening or pleural effusion.

 

MEDIASTINUM:     

The heart and great vessels demonstrate no acute abnormality.  There is no mediastinal or hilar lymph
adenopathy.

 

AXILLAE:     

Within normal limits.  No lymphadenopathy.

 

MUSCULOSKELETAL:     

Within normal limits for patient age.

 

MISCELLANEOUS:     

The visualized upper abdominal organs demonstrate cirrhotic appearing liver with abdominal ascites. T
here is portal hypertension. Splenomegaly

 

CONCLUSION:     

1. Large right pleural effusion with compressive atelectasis of the entire right lung.

2. Cirrhotic liver with abdominal ascites and portal hypertension.

 

 

 

 

 Sj Hoang MD on March 28, 2018 at 23:50           

Board Certified Radiologist.

 This report was verified electronically.

## 2018-03-29 VITALS
HEART RATE: 83 BPM | RESPIRATION RATE: 18 BRPM | SYSTOLIC BLOOD PRESSURE: 105 MMHG | OXYGEN SATURATION: 94 % | TEMPERATURE: 98.2 F | DIASTOLIC BLOOD PRESSURE: 64 MMHG

## 2018-03-29 VITALS
DIASTOLIC BLOOD PRESSURE: 64 MMHG | HEART RATE: 76 BPM | OXYGEN SATURATION: 94 % | SYSTOLIC BLOOD PRESSURE: 115 MMHG | RESPIRATION RATE: 20 BRPM

## 2018-03-29 VITALS
RESPIRATION RATE: 18 BRPM | OXYGEN SATURATION: 95 % | DIASTOLIC BLOOD PRESSURE: 65 MMHG | SYSTOLIC BLOOD PRESSURE: 137 MMHG | HEART RATE: 84 BPM

## 2018-03-29 VITALS
OXYGEN SATURATION: 92 % | RESPIRATION RATE: 16 BRPM | DIASTOLIC BLOOD PRESSURE: 65 MMHG | TEMPERATURE: 98.8 F | HEART RATE: 86 BPM | SYSTOLIC BLOOD PRESSURE: 107 MMHG

## 2018-03-29 VITALS — OXYGEN SATURATION: 95 % | RESPIRATION RATE: 16 BRPM

## 2018-03-29 LAB
D DIMER PPP FEU-MCNC: 5 %
GLUCOSE PLR-MCNC: 98 MG/DL
LDH PLR-CCNC: 71 U/L
LYMPHOCYTES NFR PLR: 62 %
PLEURAL FLUID HISTIOCYTES: 14 %
PLEURAL FLUID MESOTHELIAL: 5 %
PLEURAL FLUID MONOS: 14 %
PLEURAL FLUID RBC: 420 /MM3 (ref 0–0)
PLEURAL FLUID WBC: 195 /MM3 (ref 0–10)
PROT PLR-MCNC: 1.7 GM/DL

## 2018-03-29 RX ADMIN — SPIRONOLACTONE SCH MG: 100 TABLET, FILM COATED ORAL at 10:37

## 2018-03-29 RX ADMIN — SODIUM PHOSPHATE, DIBASIC, ANHYDROUS, POTASSIUM PHOSPHATE, MONOBASIC, AND SODIUM PHOSPHATE, MONOBASIC, MONOHYDRATE SCH MG: 852; 155; 130 TABLET, COATED ORAL at 06:24

## 2018-03-29 RX ADMIN — SODIUM PHOSPHATE, DIBASIC, ANHYDROUS, POTASSIUM PHOSPHATE, MONOBASIC, AND SODIUM PHOSPHATE, MONOBASIC, MONOHYDRATE SCH MG: 852; 155; 130 TABLET, COATED ORAL at 22:33

## 2018-03-29 RX ADMIN — FUROSEMIDE SCH MG: 40 TABLET ORAL at 22:32

## 2018-03-29 RX ADMIN — STANDARDIZED SENNA CONCENTRATE AND DOCUSATE SODIUM SCH TAB: 8.6; 5 TABLET, FILM COATED ORAL at 22:32

## 2018-03-29 RX ADMIN — LEVOTHYROXINE SODIUM SCH MCG: 50 TABLET ORAL at 06:24

## 2018-03-29 RX ADMIN — SPIRONOLACTONE SCH MG: 100 TABLET, FILM COATED ORAL at 18:09

## 2018-03-29 RX ADMIN — STANDARDIZED SENNA CONCENTRATE AND DOCUSATE SODIUM SCH TAB: 8.6; 5 TABLET, FILM COATED ORAL at 10:37

## 2018-03-29 RX ADMIN — WATER SCH ML: 1 IRRIGANT IRRIGATION at 10:37

## 2018-03-29 RX ADMIN — WATER SCH ML: 1 IRRIGANT IRRIGATION at 22:32

## 2018-03-29 RX ADMIN — FUROSEMIDE SCH MG: 40 TABLET ORAL at 10:37

## 2018-03-29 RX ADMIN — SODIUM PHOSPHATE, DIBASIC, ANHYDROUS, POTASSIUM PHOSPHATE, MONOBASIC, AND SODIUM PHOSPHATE, MONOBASIC, MONOHYDRATE SCH MG: 852; 155; 130 TABLET, COATED ORAL at 15:01

## 2018-03-29 RX ADMIN — Medication SCH ML: at 09:00

## 2018-03-29 RX ADMIN — Medication SCH ML: at 22:32

## 2018-03-29 NOTE — HHI.HP
__________________________________________________





HPI


Service


[facility]


Primary Care Physician


Isa Briggs DO


Admission Diagnosis





Large R pleural effusion; h/o cirrhosis/hepC


Diagnoses:  


Travel History


International Travel<30 Days:  No


Contact w/Intl Traveler <30 Da:  No


Traveled to Known Affected Are:  No


History of Present Illness


57-year-old male with a past medical history significant for cirrhosis and 

hepatitis C, currently undergoing treatment with Harvoni, sensitive the 

emergency department for increasingly worsening shortness of breath.  The 

patient states his symptoms started approximately 5 weeks ago when he started 

to notice he was short of breath with exertion and had a dry cough.  He states 

that his symptoms increased whereas now he becomes short of breath when lying 

flat and with any exertion whatsoever.  Patient denies any fever/chills.  

Denies any nausea/vomiting/diarrhea.  Endorses a diffuse abdominal pain and 

increasing abdominal girth.  He has had to have one paracentesis in the past 

which was done in 2017.





Review of Systems


Except as stated in HPI:  all other systems reviewed are Neg





Past Family Social History


Past Medical History


Cirrhosis


Hepatitis C


Past Surgical History


None


Reported Medications





Reported Meds & Active Scripts


Active


Ciprofloxacin (Ciprofloxacin HCl) 500 Mg Tab 500 Mg PO BID


Aldactone (Spironolactone) 100 Mg Tab 100 Mg PO BID@,18


Furosemide 40 Mg Tab 40 Mg PO BID


K-Phos Neutral (Potassium Phos/Sodium Phos) 155-852-130 Mg Tab 250 Mg PO Q8HR


[Lactulose Liq] 30 ML Syrp 30 Ml PO BID


Mephyton (Phytonadione) 5 Mg Tab 5 Mg PO DAILY


Levothyroxine (Levothyroxine Sodium) 50 Mcg Tab 50 Mcg PO DAILY


Reported


Protonix (Pantoprazole Sodium) 40 Mg Tabdr 40 Mg PO DAILY


Allergies:  


Coded Allergies:  


     No Known Allergies (Unverified  Allergy, Unknown, 3/29/18)


Family History


Negative for CAD/DM


Social History


Denies alcohol, tobacco and illicit drugs





Physical Exam


Vital Signs





Vital Signs








  Date Time  Temp Pulse Resp B/P (MAP) Pulse Ox O2 Delivery O2 Flow Rate FiO2


 


3/28/18 23:55  87 18 116/68 (84) 95 Room Air  


 


3/28/18 19:54 98.3 82 22 133/76 (95) 95 Room Air  


 


3/28/18 18:11 98.5 86 20 130/65 (86) 95   








Physical Exam


GENERAL:  male sitting up in bed


SKIN: No rashes, ecchymoses or lesions. Cool and dry.


HEAD: Atraumatic. Normocephalic. No temporal or scalp tenderness.


EYES: Pupils equal round and reactive. Extraocular motions intact. No scleral 

icterus. No injection or drainage. 


ENT: Nose without bleeding, purulent drainage or septal hematoma. Throat 

without erythema, tonsillar hypertrophy or exudate. Uvula midline. Airway 

patent.


NECK: Trachea midline. No JVD or lymphadenopathy. Supple, nontender, no 

meningeal signs.


CARDIOVASCULAR: Regular rate and rhythm without murmurs, gallops, or rubs. 


RESPIRATORY: Very diminished lung sounds throughout the entire right lung.  

Wheezes/rales/rhonchi


GASTROINTESTINAL: Abdomen soft, diffusely tender to palpation, distended. 


MUSCULOSKELETAL: Extremities without clubbing, cyanosis, or edema. No joint 

tenderness, effusion, or edema noted. No calf tenderness.


NEUROLOGICAL: Awake and alert. Cranial nerves II through XII intact.  Motor and 

sensory grossly within normal limits. Normal speech.


Laboratory





Laboratory Tests








Test


  3/28/18


20:05 3/28/18


20:15 3/28/18


20:28 3/28/18


22:18


 


B-Type Natriuretic Peptide 47    


 


Prothrombin Time  13.8   


 


Prothromb Time International


Ratio 


  1.4 


  


  


 


 


Activated Partial


Thromboplast Time 


  30.0 


  


  


 


 


Blood Urea Nitrogen  22   


 


Creatinine  1.29   


 


Random Glucose  88   


 


Calcium Level  8.4   


 


Sodium Level  134   


 


Potassium Level  5.3   


 


Chloride Level  103   


 


Carbon Dioxide Level  23.2   


 


Anion Gap  8   


 


Estimat Glomerular Filtration


Rate 


  57 


  


  


 


 


Magnesium Level  1.8   


 


Total Bilirubin  2.8   


 


Direct Bilirubin  0.9   


 


Indirect Bilirubin  1.9   


 


Aspartate Amino Transf


(AST/SGOT) 


  83 


  


  


 


 


Alanine Aminotransferase


(ALT/SGPT) 


  41 


  


  


 


 


Alkaline Phosphatase  103   


 


Troponin I  LESS THAN 0.02   


 


Total Protein  7.4   


 


Albumin  2.3   


 


Ammonia   23  


 


White Blood Count    5.2 


 


Red Blood Count    3.83 


 


Hemoglobin    12.6 


 


Hematocrit    36.2 


 


Mean Corpuscular Volume    94.5 


 


Mean Corpuscular Hemoglobin    33.0 


 


Mean Corpuscular Hemoglobin


Concent 


  


  


  34.9 


 


 


Red Cell Distribution Width    14.2 


 


Platelet Count    71 


 


Mean Platelet Volume    8.3 


 


Neutrophils (%) (Auto)    54.9 


 


Lymphocytes (%) (Auto)    17.6 


 


Monocytes (%) (Auto)    21.5 


 


Eosinophils (%) (Auto)    5.4 


 


Basophils (%) (Auto)    0.6 


 


Neutrophils # (Auto)    2.9 


 


Lymphocytes # (Auto)    0.9 


 


Monocytes # (Auto)    1.1 


 


Eosinophils # (Auto)    0.3 


 


Basophils # (Auto)    0.0 


 


CBC Comment    AUTO DIFF 


 


Differential Comment


  


  


  


  AUTO DIFF


CONFIRMED


 


Platelet Estimate    LOW 


 


Platelet Morphology Comment    NORMAL 


 


Red Cell Morphology Comment    NORMAL 








Result Diagram:  


3/28/18 2218                                                                   

             3/28/18 2015








Caprini VTE Risk Assessment


Caprini VTE Risk Assessment:  No/Low Risk (score <= 1)


Caprini Risk Assessment Model











 Point Value = 1          Point Value = 2  Point Value = 3  Point Value = 5


 


Age 41-60


Minor surgery


BMI > 25 kg/m2


Swollen legs


Varicose veins


Pregnancy or postpartum


History of unexplained or recurrent


   spontaneous 


Oral contraceptives or hormone


   replacement


Sepsis (< 1 month)


Serious lung disease, including


   pneumonia (< 1 month)


Abnormal pulmonary function


Acute myocardial infarction


Congestive heart failure (< 1 month)


History of inflammatory bowel disease


Medical patient at bed rest Age 61-74


Arthroscopic surgery


Major open surgery (> 45 min)


Laparoscopic surgery (> 45 min)


Malignancy


Confined to bed (> 72 hours)


Immobilizing plaster cast


Central venous access Age >= 75


History of VTE


Family history of VTE


Factor V Leiden


Prothrombin 23929O


Lupus anticoagulant


Anticardiolipin antibodies


Elevated serum homocysteine


Heparin-induced thrombocytopenia


Other congenital or acquired


   thrombophilia Stroke (< 1 month)


Elective arthroplasty


Hip, pelvis, or leg fracture


Acute spinal cord injury (< 1 month)








Prophylaxis Regimen











   Total Risk


Factor Score Risk Level Prophylaxis Regimen


 


0-1      Low Early ambulation


 


2 Moderate Order ONE of the following:


*Sequential Compression Device (SCD)


*Heparin 5000 units SQ BID


 


3-4 Higher Order ONE of the following medications:


*Heparin 5000 units SQ TID


*Enoxaparin/Lovenox 40 mg SQ daily (WT < 150 kg, CrCl > 30 mL/min)


*Enoxaparin/Lovenox 30 mg SQ daily (WT < 150 kg, CrCl > 10-29 mL/min)


*Enoxaparin/Lovenox 30 mg SQ BID (WT < 150 kg, CrCl > 30 mL/min)


AND/OR


*Sequential Compression Device (SCD)


 


5 or more Highest Order ONE of the following medications:


*Heparin 5000 units SQ TID (Preferred with Epidurals)


*Enoxaparin/Lovenox 40 mg SQ daily (WT < 150 kg, CrCl > 30 mL/min)


*Enoxaparin/Lovenox 30 mg SQ daily (WT < 150 kg, CrCl > 10-29 mL/min)


*Enoxaparin/Lovenox 30 mg SQ BID (WT < 150 kg, CrCl > 30 mL/min)


AND


*Sequential Compression Device (SCD)











Assessment and Plan


Assessment and Plan


Assessment/plan:





1.  Pleural effusion


Chest CT shows large right pleural effusion with compressive atelectasis of the 

entire right lung


Interventional radiology consulted for thoracentesis





2.  Abdominal distention/ascites


Chest CT shows cirrhotic liver with abdominal ascites


Ultrasound of the abdomen pending to evaluate for possible paracentesis





3.  Cirrhosis/hepatitis C


Patient currently undergoing treatment with Harvoni


His gastroenterologist is Dr. Hamilton


Continue home spironolactone, Lasix, lactulose





4.  Hypothyroidism


Continue home Synthroid





FEN


NPO


Electrolytes: monitor and replete prn


Holding pharmacologic anticoagulation in anticipation of thoracentesis











Anne Zhang MD Mar 29, 2018 00:45

## 2018-03-29 NOTE — PD.RAD
Post US Procedure Prog Note


Pre Procedure Diagnosis:  


(1) Pleural effusion associated with hepatic disorder


Post Procedure Diagnosis:  


(1) Pleural effusion associated with hepatic disorder


Procedure Date:


Mar 29, 2018


Supervising Radiologist:


Jamil Gilliland


Anesthesia:  Local


Plan of Activity


Patient to Unit:  Other


Patient Condition:  Good


See PACS Report for procedural detail/treatment





Drainage Procedure


Procedure 1


Imaging Guidance:  Ultrasound


Side:  Right


Procedure Type:  Thoracentesis


Procedure:  Removal


[place] Islander:  6


Fluid Removal (CCs):  2600


Fluid Description:  Cloudy, Yellow











Jamil Gilliland MD Mar 29, 2018 10:33

## 2018-03-29 NOTE — EKG
Date Performed: 03/29/2018       Time Performed: 01:25:06

 

PTAGE:      57 years

 

EKG:      Sinus rhythm 

 

 VOLTAGE CRITERIA FOR LVH ABNORMAL ECG

 

PREVIOUS TRACING       : 06/14/2012 10.53 Since the previous tracing, no significant change noted

 

DOCTOR:   Jermaine Faye  Interpretating Date/Time  03/29/2018 12:22:27

## 2018-03-29 NOTE — RADRPT
EXAM DATE/TIME:  03/29/2018 08:36 

 

[facility] COMPARISON:     

No previous studies available for comparison.

        

 

 

INDICATIONS :     

Abdominal pain. 

                     

 

MEDICAL HISTORY :           

Hypothyroidism. Bilateral leg pain. Hepatitis C. Cirrhosis.

 

SURGICAL HISTORY :     

Tonsillectomy.     

 

ENCOUNTER:     

Subsequent

 

ACUITY:     

1 day

 

PAIN SCORE:     

3/10

 

LOCATION:         

Abdomen. 

                     

MEASUREMENTS:     

 

LIVER:     

14.0 cm length 

 

COMMON DUCT:     

3 mm

 

RIGHT KIDNEY:     

11.0 x 5.6 x 5.6  cm

 

LEFT KIDNEY:     

11.9 x 4.5 x 3.6 cm

 

SPLEEN:     

17.9 cm length

 

AORTA:     

1.77cm maximal      

 

FINDINGS:     

 

LIVER:     

Normal echotexture without focal lesion or ductal dilatation. There is some ascites in the upper abdo
men. The portal system is patent. 

 

COMMON DUCT:     

No intraluminal mass or stone visualized.  

 

GALLBLADDER:     

No definite gallstones. There is gallbladder wall thickening at 5 mm.

 

PANCREAS:     

Not visualized

 

RIGHT KIDNEY:     

No hydronephrosis, stone or mass.  

 

LEFT KIDNEY:     

No hydronephrosis, stone or mass.  

 

SPLEEN:     

No focal lesion.  

 

AORTA:     

Non aneurysmal.  

 

IVC:     

Within normal limits.  

 

CONCLUSION:     

1. No definite gallstones or biliary tract obstruction.

2. Gallbladder wall thickening at 5 mm which can be seen with chronic gallbladder disease.

3. There is evidence of some abdominal ascites in the upper abdomen.

4. The spleen appears to be enlarged.

 

 

 

 Moreno Rivas MD on March 29, 2018 at 10:39           

Board Certified Radiologist.

 This report was verified electronically.

## 2018-03-29 NOTE — RADRPT
EXAM DATE/TIME:  03/29/2018 09:48 

 

[facility] COMPARISON:     

CHEST PA & LAT, March 28, 2018, 18:27.

 

                     

INDICATIONS :     

S/p right side thoracentesis

                     

 

MEDICAL HISTORY :     

Hepatitis C.  Cirrhosis.        

 

SURGICAL HISTORY :     

None.   

 

ENCOUNTER:     

Initial                                        

 

ACUITY:     

1 day      

 

PAIN SCORE:     

1/10

 

LOCATION:     

Bilateral chest 

 

FINDINGS:     

Status post right thoracentesis. No pneumothorax. There continues to be a significant amount consolid
ation of the right hemithorax. The left lung is clear. The heart size is stable.

 

CONCLUSION:     

Status post right thoracentesis. No pneumothorax.

 

 

 

 Moreno Rivas MD on March 29, 2018 at 10:31           

Board Certified Radiologist.

 This report was verified electronically.

## 2018-03-29 NOTE — RADRPT
EXAM DATE/TIME:  03/29/2018 08:28 

 

[facility] COMPARISON:     

CHEST PA & LAT, March 28, 2018, 18:27.  CHEST EXPIRATION ONLY, March 29, 2018, 9:48.

 

 

INDICATIONS :      

Right pleural effusion. 

                     

                     

 

 

 

MEDICAL HISTORY :        

Hypothyroidism. Bilateral leg pain. Hepatitis C. Cirrhosis.

 

SURGICAL HISTORY :     

Tonsillectomy.   Paracentesis.

 

ENCOUNTER:     

Initial

 

ACUITY:     

1 week

 

PAIN SCORE:     

3/10

 

LOCATION:     

Right chest

                     

 

FLUID:

Total volume of 2600 cc of clear, yellow fluid was removed.

Fluid was sent to lab for ordered studies. 

 

 

TECHNIQUE:  

1.  Ultrasound guidance for thoracentesis.  

2.  Thoracentesis.

 

The risks, benefits, and alternatives to ultrasound guided thoracentesis were explained to the patien
t in lay simple terms, including the risk of bleeding and infection.  Written and verbal informed con
sent was obtained.  

 

Appropriate area for thoracentesis was marked under ultrasound guidance with the patient in the uprig
ht position.  Overlying skin was prepped and draped in the usual sterile fashion and with local anest
hetic, a dermatotomy was made with an 11 blade scalpel.  A 6 [place]i thoracentesis catheter was placed
 in the pleural space and fluid was removed.  Catheter was then removed and a sterile dressing applie
d. There were no immediate complications.  The patient tolerated the procedure well and the left the 
ultrasound suite in stable condition.  Chest radiograph is to be obtained. 

 

CONCLUSION:     

Uncomplicated ultrasound guided thoracentesis.  

 

 

 

 Jamil Gilliland MD on March 29, 2018 at 10:27           

Board Certified Radiologist.

 This report was verified electronically.

## 2018-03-30 VITALS
SYSTOLIC BLOOD PRESSURE: 106 MMHG | DIASTOLIC BLOOD PRESSURE: 60 MMHG | HEART RATE: 88 BPM | RESPIRATION RATE: 14 BRPM | TEMPERATURE: 98 F | OXYGEN SATURATION: 95 %

## 2018-03-30 VITALS
OXYGEN SATURATION: 96 % | RESPIRATION RATE: 16 BRPM | SYSTOLIC BLOOD PRESSURE: 90 MMHG | DIASTOLIC BLOOD PRESSURE: 52 MMHG | HEART RATE: 82 BPM | TEMPERATURE: 98.8 F

## 2018-03-30 VITALS
OXYGEN SATURATION: 100 % | HEART RATE: 85 BPM | SYSTOLIC BLOOD PRESSURE: 89 MMHG | RESPIRATION RATE: 16 BRPM | DIASTOLIC BLOOD PRESSURE: 57 MMHG | TEMPERATURE: 99.1 F

## 2018-03-30 VITALS
TEMPERATURE: 98.6 F | DIASTOLIC BLOOD PRESSURE: 70 MMHG | SYSTOLIC BLOOD PRESSURE: 117 MMHG | RESPIRATION RATE: 20 BRPM | OXYGEN SATURATION: 95 % | HEART RATE: 82 BPM

## 2018-03-30 VITALS
RESPIRATION RATE: 16 BRPM | DIASTOLIC BLOOD PRESSURE: 58 MMHG | OXYGEN SATURATION: 93 % | HEART RATE: 84 BPM | TEMPERATURE: 98.8 F | SYSTOLIC BLOOD PRESSURE: 101 MMHG

## 2018-03-30 VITALS — HEART RATE: 84 BPM

## 2018-03-30 VITALS
RESPIRATION RATE: 14 BRPM | OXYGEN SATURATION: 98 % | TEMPERATURE: 96.6 F | HEART RATE: 85 BPM | SYSTOLIC BLOOD PRESSURE: 110 MMHG | DIASTOLIC BLOOD PRESSURE: 66 MMHG

## 2018-03-30 VITALS — OXYGEN SATURATION: 95 %

## 2018-03-30 LAB
ALBUMIN SERPL-MCNC: 2 GM/DL (ref 3.4–5)
ALP SERPL-CCNC: 98 U/L (ref 45–117)
ALT SERPL-CCNC: 36 U/L (ref 12–78)
AST SERPL-CCNC: 49 U/L (ref 15–37)
BASOPHILS # BLD AUTO: 0 TH/MM3 (ref 0–0.2)
BASOPHILS NFR BLD: 1.1 % (ref 0–2)
BILIRUB SERPL-MCNC: 3 MG/DL (ref 0.2–1)
BUN SERPL-MCNC: 27 MG/DL (ref 7–18)
CALCIUM SERPL-MCNC: 7.9 MG/DL (ref 8.5–10.1)
CHLORIDE SERPL-SCNC: 101 MEQ/L (ref 98–107)
CREAT SERPL-MCNC: 1.23 MG/DL (ref 0.6–1.3)
EOSINOPHIL # BLD: 0.2 TH/MM3 (ref 0–0.4)
EOSINOPHIL NFR BLD: 4.3 % (ref 0–4)
ERYTHROCYTE [DISTWIDTH] IN BLOOD BY AUTOMATED COUNT: 14.1 % (ref 11.6–17.2)
GFR SERPLBLD BASED ON 1.73 SQ M-ARVRAT: 60 ML/MIN (ref 89–?)
GLUCOSE SERPL-MCNC: 93 MG/DL (ref 74–106)
HCO3 BLD-SCNC: 24 MEQ/L (ref 21–32)
HCT VFR BLD CALC: 35.4 % (ref 39–51)
HGB BLD-MCNC: 12.3 GM/DL (ref 13–17)
LYMPHOCYTES # BLD AUTO: 0.7 TH/MM3 (ref 1–4.8)
LYMPHOCYTES NFR BLD AUTO: 16.5 % (ref 9–44)
MCH RBC QN AUTO: 33.1 PG (ref 27–34)
MCHC RBC AUTO-ENTMCNC: 34.8 % (ref 32–36)
MCV RBC AUTO: 95.1 FL (ref 80–100)
MONOCYTE #: 0.9 TH/MM3 (ref 0–0.9)
MONOCYTES NFR BLD: 20.3 % (ref 0–8)
NEUTROPHILS # BLD AUTO: 2.5 TH/MM3 (ref 1.8–7.7)
NEUTROPHILS NFR BLD AUTO: 57.8 % (ref 16–70)
OVALOCYTES BLD QL SMEAR: (no result)
PLATELET # BLD: 65 TH/MM3 (ref 150–450)
PMV BLD AUTO: 8.9 FL (ref 7–11)
PROT SERPL-MCNC: 6.3 GM/DL (ref 6.4–8.2)
RBC # BLD AUTO: 3.72 MIL/MM3 (ref 4.5–5.9)
SODIUM SERPL-SCNC: 134 MEQ/L (ref 136–145)
WBC # BLD AUTO: 4.2 TH/MM3 (ref 4–11)

## 2018-03-30 RX ADMIN — SODIUM PHOSPHATE, DIBASIC, ANHYDROUS, POTASSIUM PHOSPHATE, MONOBASIC, AND SODIUM PHOSPHATE, MONOBASIC, MONOHYDRATE SCH MG: 852; 155; 130 TABLET, COATED ORAL at 06:48

## 2018-03-30 RX ADMIN — ALBUMIN HUMAN SCH MLS/HR: 0.25 SOLUTION INTRAVENOUS at 17:00

## 2018-03-30 RX ADMIN — WATER SCH ML: 1 IRRIGANT IRRIGATION at 09:49

## 2018-03-30 RX ADMIN — Medication SCH ML: at 22:59

## 2018-03-30 RX ADMIN — FUROSEMIDE SCH MG: 40 TABLET ORAL at 23:11

## 2018-03-30 RX ADMIN — STANDARDIZED SENNA CONCENTRATE AND DOCUSATE SODIUM SCH TAB: 8.6; 5 TABLET, FILM COATED ORAL at 09:00

## 2018-03-30 RX ADMIN — SPIRONOLACTONE SCH MG: 100 TABLET, FILM COATED ORAL at 09:50

## 2018-03-30 RX ADMIN — SODIUM PHOSPHATE, DIBASIC, ANHYDROUS, POTASSIUM PHOSPHATE, MONOBASIC, AND SODIUM PHOSPHATE, MONOBASIC, MONOHYDRATE SCH MG: 852; 155; 130 TABLET, COATED ORAL at 18:40

## 2018-03-30 RX ADMIN — Medication SCH ML: at 09:51

## 2018-03-30 RX ADMIN — WATER SCH ML: 1 IRRIGANT IRRIGATION at 23:11

## 2018-03-30 RX ADMIN — STANDARDIZED SENNA CONCENTRATE AND DOCUSATE SODIUM SCH TAB: 8.6; 5 TABLET, FILM COATED ORAL at 09:50

## 2018-03-30 RX ADMIN — LEVOTHYROXINE SODIUM SCH MCG: 50 TABLET ORAL at 06:48

## 2018-03-30 RX ADMIN — FUROSEMIDE SCH MG: 40 TABLET ORAL at 09:50

## 2018-03-30 RX ADMIN — SODIUM PHOSPHATE, DIBASIC, ANHYDROUS, POTASSIUM PHOSPHATE, MONOBASIC, AND SODIUM PHOSPHATE, MONOBASIC, MONOHYDRATE SCH MG: 852; 155; 130 TABLET, COATED ORAL at 23:12

## 2018-03-30 RX ADMIN — STANDARDIZED SENNA CONCENTRATE AND DOCUSATE SODIUM SCH TAB: 8.6; 5 TABLET, FILM COATED ORAL at 21:00

## 2018-03-30 RX ADMIN — ALBUMIN HUMAN SCH MLS/HR: 0.25 SOLUTION INTRAVENOUS at 22:58

## 2018-03-30 RX ADMIN — SPIRONOLACTONE SCH MG: 100 TABLET, FILM COATED ORAL at 18:40

## 2018-03-30 NOTE — MB
cc:

MIKA Mora MD

****

 

 

DATE:

03/30/2018

 

REASON FOR CONSULTATION:

Pleural effusion.

 

HISTORY OF PRESENT ILLNESS:

This is a 57-year-old white male with a history of cirrhosis of the 

liver and hepatitis C, who was admitted with progressive shortness of 

breath, orthopnea and a persistent cough.  The patient was unable to 

lie flat and was short of breath with exertion and he has thus 

presented to the emergency room where he had abdominal CT which showed

a large effusion on the right side.  The chest CT demonstrated 

cirrhosis of the liver with abdominal ascites and portal hypertension,

as well as compressive atelectasis of the right lung.  A thoracentesis

was performed with removal of over 2.5 liters of fluid from the right 

chest and his breathing is much better and his chest x-ray showed 

resolution of the effusion with no pneumothorax.  The patient 

presently is on room air, maintaining a sat of over 94%.  Denies chest

pains, but still has some abdominal distention and ascites.  The 

patient is on diuretic therapy including Aldactone and Lasix.

 

PAST MEDICAL HISTORY:

The patient history has included history for cirrhosis of the liver 

and hepatitis C.  No history of chronic lung disease.

 

ALLERGIES:

NONE LISTED.

 

FAMILY HISTORY:

Noncontributory.

 

HABITS AND SOCIAL HISTORY:

The patient denies history of smoking or significant alcohol use.

 

MEDICATION LIST:

1.  Lasix 40 mg b.i.d.

2.  Aldactone 100 mg b.i.d.

3.  Mephyton 5 mg daily.

4.  Levothyroxine 50 mcg daily.

5.  Cipro 500 mg b.i.d.

 

REVIEW OF SYSTEMS:

The patient has lost weight.  Denies headaches or blackouts.  He has 

some chest tightness and wheezing.  Denies epigastric pains, but has 

some abdominal bloating, nausea, has urinary frequency and he has had 

some leg swelling as well and joint pains.

 

PHYSICAL EXAMINATION:

GENERAL:  This average developed, middle-aged man is in no acute 

distress.  No clubbing, cyanosis or peripheral edema.

VITAL SIGNS:  Blood pressure 116/60, pulse 90, respirations 22, 

temperature 98.2.

HEENT:  Head is normocephalic.  Pupils are reactive and equal.  

Sclerae were injected.  Throat was clear.  Nasal mucosa is clear.

NECK:  Supple, no bruits or thyroid enlargement or lymphadenopathy.

CHEST:  Distant breath sounds over the right middle and upper chest 

with wheezes bilaterally.

HEART:  The heart sounds are regular, S1 and S2 with no murmur, no S3.

ABDOMEN:  Soft, protuberant with the liver felt below the costal 

margin 3 fingerbreadths.  Ascites noted.  Bowel sounds are active.

EXTREMITIES:  Decreased peripheral pulses.  Reflexes are 1+ with no 

gross motor deficits.

NEUROLOGIC:  Cranial nerves grossly intact.

RECTAL:  Deferred.

SKIN:  No lesions.

 

IMPRESSION:

1.  Large right pleural effusion, resolved.

2.  Atelectasis right lung, compressive atelectasis.

3.  History of abdominal ascites with cirrhosis of the liver.

4.  Hepatitis C.

5.  Hypothyroidism.

 

PLAN:

The patient has been given an incentive spirometer every 2 hours, 

nebulized DuoNeb solution 3 times daily, and we also will get a 

pulmonary function study at the bedside.  The patient states he wants 

to go home.  If he does go home, we could follow him up as an 

outpatient in 2 weeks and get a followup chest x-ray.  The patient 

will be continued on diuretic therapy and he could use albuterol 

inhaler 2 puffs q.i.d. p.r.n.  Oxygen supplementation added at 2 

liters nasal cannula p.r.n. and I will get a followup chest x-ray in 

the a.m. and follow the case with you.

 

 

__________________________________

V. GEOFFREY Mora MD

 

 

[facility]

D: 03/30/2018, 06:38 PM

T: 03/30/2018, 07:45 PM

Visit #: [id number]

Job #: [id number]

## 2018-03-30 NOTE — HHI.DS
__________________________________________________





Discharge Summary


Admission Date


Mar 28, 2018 at 23:28


Discharge Date:  Mar 30, 2018


Admitting Diagnosis





Large R pleural effusion; h/o cirrhosis/hepC





Brief History - From Admission


57-year-old male with a past medical history significant for cirrhosis and 

hepatitis C, currently undergoing treatment with Harvoni, sensitive the 

emergency department for increasingly worsening shortness of breath.  The 

patient states his symptoms started approximately 5 weeks ago when he started 

to notice he was short of breath with exertion and had a dry cough.  He states 

that his symptoms increased whereas now he becomes short of breath when lying 

flat and with any exertion whatsoever.  Patient denies any fever/chills.  

Denies any nausea/vomiting/diarrhea.  Endorses a diffuse abdominal pain and 

increasing abdominal girth.  He has had to have one paracentesis in the past 

which was done in October 2017.


CBC/BMP:  


3/30/18 0630                                                                   

             3/30/18 0630





Significant Findings





Laboratory Tests








Test


  3/28/18


20:05 3/28/18


20:15 3/28/18


20:28 3/28/18


22:18


 


Prothrombin Time


  


  13.8 SEC


(9.8-11.6) 


  


 


 


Blood Urea Nitrogen


  


  22 MG/DL


(7-18) 


  


 


 


Calcium Level


  


  8.4 MG/DL


(8.5-10.1) 


  


 


 


Sodium Level


  


  134 MEQ/L


(136-145) 


  


 


 


Potassium Level


  


  5.3 MEQ/L


(3.5-5.1) 


  


 


 


Estimat Glomerular Filtration


Rate 


  57 ML/MIN


(>89) 


  


 


 


Total Bilirubin


  


  2.8 MG/DL


(0.2-1.0) 


  


 


 


Direct Bilirubin


  


  0.9 MG/DL


(0.0-0.2) 


  


 


 


Indirect Bilirubin


  


  1.9 MG/DL


(0.0-0.8) 


  


 


 


Aspartate Amino Transf


(AST/SGOT) 


  83 U/L (15-37) 


  


  


 


 


Troponin I


  


  LESS THAN 0.02


NG/ML 


  


 


 


Albumin


  


  2.3 GM/DL


(3.4-5.0) 


  


 


 


Red Blood Count


  


  


  


  3.83 MIL/MM3


(4.50-5.90)


 


Hemoglobin


  


  


  


  12.6 GM/DL


(13.0-17.0)


 


Hematocrit


  


  


  


  36.2 %


(39.0-51.0)


 


Platelet Count


  


  


  


  71 TH/MM3


(150-450)


 


Monocytes (%) (Auto)


  


  


  


  21.5 %


(0.0-8.0)


 


Eosinophils (%) (Auto)


  


  


  


  5.4 %


(0.0-4.0)


 


Lymphocytes # (Auto)


  


  


  


  0.9 TH/MM3


(1.0-4.8)


 


Monocytes # (Auto)


  


  


  


  1.1 TH/MM3


(0-0.9)


 


Platelet Estimate    LOW (NORMAL) 


 


Test


  3/29/18


00:48 3/29/18


09:32 3/30/18


06:30 3/30/18


12:32


 


Pleural Fluid WBC


  


  195 /MM3


(0-10) 


  


 


 


Pleural Fluid RBC  420 /MM3 (0-0)   


 


Red Blood Count


  


  


  3.72 MIL/MM3


(4.50-5.90) 


 


 


Hemoglobin


  


  


  12.3 GM/DL


(13.0-17.0) 


 


 


Hematocrit


  


  


  35.4 %


(39.0-51.0) 


 


 


Platelet Count


  


  


  65 TH/MM3


(150-450) 


 


 


Monocytes (%) (Auto)


  


  


  20.3 %


(0.0-8.0) 


 


 


Eosinophils (%) (Auto)


  


  


  4.3 %


(0.0-4.0) 


 


 


Lymphocytes # (Auto)


  


  


  0.7 TH/MM3


(1.0-4.8) 


 


 


Platelet Estimate   LOW (NORMAL)  


 


Platelet Morphology Comment


  


  


  ENLARGED


(NORMAL) 


 


 


Ovalocytes   1+ (NORMAL)  


 


Blood Urea Nitrogen


  


  


  27 MG/DL


(7-18) 


 


 


Total Protein


  


  


  6.3 GM/DL


(6.4-8.2) 


 


 


Albumin


  


  


  2.0 GM/DL


(3.4-5.0) 


 


 


Calcium Level


  


  


  7.9 MG/DL


(8.5-10.1) 


 


 


Aspartate Amino Transf


(AST/SGOT) 


  


  49 U/L (15-37) 


  


 


 


Total Bilirubin


  


  


  3.0 MG/DL


(0.2-1.0) 


 


 


Sodium Level


  


  


  134 MEQ/L


(136-145) 


 


 


Estimat Glomerular Filtration


Rate 


  


  60 ML/MIN


(>89) 


 








Hospital Course


//Pleural effusion


Chest CT shows large right pleural effusion with compressive atelectasis of the 

entire right lung


Interventional radiology consulted for thoracentesis.


= Discussed with pulmonology today, who is cleared for discharge.





//Abdominal distention/ascites


Chest CT shows cirrhotic liver with abdominal ascites


Ultrasound of the abdomen pending to evaluate for possible paracentesis


= Status post paracentesis.  Improved.  We discussed moderate fluid restrictions

, compliance with medications.  Has received 1 day of albumin.  Patient would 

like to go home.





//Cirrhosis/hepatitis C


Patient currently undergoing treatment with Harvoni


His gastroenterologist is Dr. Hamilton


Continue home spironolactone, Lasix, lactulose





/// Hypothyroidism


Continue home Synthroid





FEN


NPO


Electrolytes: monitor and replete prn


Holding pharmacologic anticoagulation in anticipation of thoracentesis


Discharge Planning


Discharge home in good condition.  Continue previous diet.  Activity ad ni.  

Please see discharge medication reconciliation for medication list.  Follow-up 

with primary care, pulmonology, gastroenterology











John Dennison MD Mar 30, 2018 14:21





Addendum: John Dennison MD on 3/30/18 @ 14:27





Patient is alert and oriented 4.


Pt Condition on Discharge:  Good


Discharge Disposition:  Discharge Home


Discharge Time:  > 30 minutes


Discharge Instructions


DIET: Follow Instructions for:  Heart Healthy Diet


Activities you can perform:  Regular-No Restrictions











John Dennison MD Mar 30, 2018 22:46

## 2018-03-30 NOTE — HHI.PR
Subjective


Remarks


Patient seen this morning around 9 AM.  Says he is feeling well.  Denies any 

chest pain or shortness of breath





Objective





Vital Signs








  Date Time  Temp Pulse Resp B/P (MAP) Pulse Ox O2 Delivery O2 Flow Rate FiO2


 


3/30/18 08:33     95   21


 


3/30/18 08:10 98.0 88 14 106/60 (75) 95   


 


3/30/18 06:38        21


 


3/30/18 05:31  84      


 


3/30/18 04:21 98.8 82 16 90/52 (65) 96   


 


3/30/18 00:27 99.1 85 16 89/57 (68) 100   


 


3/29/18 21:09 98.8 86 16 107/65 (79) 92   


 


3/29/18 16:26 98.2 83 18 105/64 (78) 94   


 


3/29/18 14:10        








Result Diagram:  


3/30/18 0630                                                                   

             3/30/18 0630





Objective Remarks


GENERAL: Patient sitting up in bed.  Appears comfortable.


SKIN: Warm and dry.


HEAD: Normocephalic.


EYES: No scleral icterus. No injection or drainage. 


NECK: Supple, trachea midline. No JVD or lymphadenopathy.


CARDIOVASCULAR: Regular rate and rhythm without murmurs, gallops, or rubs. 


RESPIRATORY: Some decreased breath sounds of the right lower lung.  No rhonchi.

  No wheezes.  No rales


GASTROINTESTINAL: Abdomen soft, non-tender, nondistended. 


MUSCULOSKELETAL: No cyanosis, or edema. 


BACK: Nontender without obvious deformity. No CVA tenderness.








A/P


Assessment and Plan





//Pleural effusion


Chest CT shows large right pleural effusion with compressive atelectasis of the 

entire right lung


Interventional radiology consulted for thoracentesis.


= Discussed with pulmonology today, who is cleared for discharge.





//Abdominal distention/ascites


Chest CT shows cirrhotic liver with abdominal ascites


Ultrasound of the abdomen pending to evaluate for possible paracentesis


= Status post paracentesis.  Improved.  We discussed moderate fluid restrictions

, compliance with medications.  Has received 1 day of albumin.  Patient would 

like to go home.





//Cirrhosis/hepatitis C


Patient currently undergoing treatment with Jareth


His gastroenterologist is Dr. Hamilton


Continue home spironolactone, Lasix, lactulose





/// Hypothyroidism


Continue home Synthroid





FEN


NPO


Electrolytes: monitor and replete prn


Holding pharmacologic anticoagulation in anticipation of thoracentesis


Discharge Planning


Discharge home in good condition.  Continue previous diet.  Activity ad ni.  

Please see discharge medication reconciliation for medication list.  Follow-up 

with primary care, pulmonology, gastroenterology











John Dennison MD Mar 30, 2018 14:21

## 2018-03-30 NOTE — RADRPT
EXAM DATE/TIME:  03/30/2018 07:30 

 

[facility] COMPARISON:     

CHEST EXPIRATION ONLY, March 29, 2018, 9:48.  CHEST SINGLE AP, November 03, 2017, 16:30.

 

                     

INDICATIONS :     

Follow up post thoracentesis, shortness of breath.

                     

 

MEDICAL HISTORY :     

None.          

 

SURGICAL HISTORY :        

Thoracentesis.

 

ENCOUNTER:     

Subsequent                                        

 

ACUITY:     

2 days      

 

PAIN SCORE:     

0/10

 

LOCATION:     

Bilateral chest 

 

FINDINGS:     

Increasing opacity is identified in the right hemithorax characteristic of reaccumulating pleural eff
usion.

 

The left lung remains clear. Heart and mediastinal structures are stable.

 

CONCLUSION:     

1. Reaccumulating right pleural effusion status post thoracentesis.

2. Otherwise stable chest.

 

 

 

 Jamil Gilliland MD on March 30, 2018 at 7:39           

Board Certified Radiologist.

 This report was verified electronically.

## 2018-03-30 NOTE — PD.CONS
HPI


History of Present Illness


This is a 57 year old male with hep-c on Harvoni who presented to ER for 

worsening SOB, initially on exertion and now at rest. Onset 5 weeks ago.  

Imaging showed pleural effusion right long, cirrhosis, ascites.  He is s/p 

paracentesis wit removal 2600cc fluid.  He had some immediate relief after his 

thoracentesis and feels that he continues to improve.  He has some abd 

distention he says is normal for him, no more than usual at this time.  he has 

had some BLE edema.  Denies abd pain.  No n/v.  He was evaluated by our service 

on a previous admission 11/2017, had paracentesis with 6.5 L removed.  he had a 

liver w/u at that time that was unremarkable other than hep c, he was referred 

to our office for outpt tx for hep C.  He started harvoni 3/6/18 and a week 

after tx he noticed his muscle cramps and spasms have improved.  


 (Saundra Demarco)





PFSH


Past Medical History


Cirrhosis


Hepatitis C


Past Surgical History


None


 (Saundra Demarco)


Coded Allergies:  


     No Known Allergies (Unverified  Allergy, Unknown, 3/29/18)


Family History


Negative for CAD/DM


Social History


Denies alcohol, tobacco and illicit drugs


 (Saundra Demarco)





Review of Systems


Constitutional:  DENIES: Fever


Endocrine:  DENIES: Polydipsia


Eyes:  DENIES: Blurred vision


Ears, nose, mouth, throat:  DENIES: Hearing loss


Respiratory:  COMPLAINS OF: Cough


Cardiovascular:  DENIES: Chest pain


Gastrointestinal:  COMPLAINS OF: Swelling of Abdomen, DENIES: Abdominal pain, 

Nausea, Vomiting


Genitourinary:  DENIES: Hematuria


Musculoskeletal:  DENIES: Joint Swelling


Integumentary:  COMPLAINS OF: Jaundice


Hematologic/lymphatic:  DENIES: Bruising


Immunologic/allergic:  DENIES: Eczema


Neurologic:  DENIES: Abnormal gait


Psychiatric:  DENIES: Confusion (Saundra Demarco)





GI Exam


Vitals I&O





Vital Signs








  Date Time  Temp Pulse Resp B/P (MAP) Pulse Ox O2 Delivery O2 Flow Rate FiO2


 


3/30/18 08:10 98.0 88 14 106/60 (75) 95   


 


3/30/18 06:38        21


 


3/30/18 05:31  84      


 


3/30/18 04:21 98.8 82 16 90/52 (65) 96   


 


3/30/18 00:27 99.1 85 16 89/57 (68) 100   


 


3/29/18 21:09 98.8 86 16 107/65 (79) 92   


 


3/29/18 16:26 98.2 83 18 105/64 (78) 94   


 


3/29/18 14:10        


 


3/29/18 10:35  76 20 115/64 (81) 94 Room Air  








Imaging





Last Impressions








Chest X-Ray 3/30/18 0700 Signed





Impressions: 





 Service Date/Time:  Friday, March 30, 2018 07:30 - CONCLUSION:  1. 





 Reaccumulating right pleural effusion status post thoracentesis. 2. Otherwise 





 stable chest.     Jamil Gilliland MD 


 


Thoracentesis Ultrasound 3/29/18 0000 Signed





Impressions: 





 Service Date/Time:  Thursday, March 29, 2018 08:28 - CONCLUSION:  

Uncomplicated 





 ultrasound guided thoracentesis.       Jamil Gilliland MD 


 


Abdomen Ultrasound 3/29/18 0000 Signed





Impressions: 





 Service Date/Time:  Thursday, March 29, 2018 08:36 - CONCLUSION:  1. No 

definite 





 gallstones or biliary tract obstruction. 2. Gallbladder wall thickening at 5 

mm 





 which can be seen with chronic gallbladder disease. 3. There is evidence of 

some 





 abdominal ascites in the upper abdomen. 4. The spleen appears to be enlarged. 

   





  Moreno Rivas MD 


 


Chest CT 3/28/18 0000 Signed





Impressions: 





 Service Date/Time:  Wednesday, March 28, 2018 23:39 - CONCLUSION:  1. Large 





 right pleural effusion with compressive atelectasis of the entire right lung. 

2. 





 Cirrhotic liver with abdominal ascites and portal hypertension.      Sj Hoang MD 








Laboratory











Test


  3/29/18


09:32 3/30/18


06:30


 


Pleural Fluid  /MM3  


 


Pleural Fluid  /MM3  


 


Pleural Fluid Neutrophils 5 %  


 


Pleural Fluid Lymphocytes 62 %  


 


Pleural Fluid Monocytes 14 %  


 


Pleural Fluid Histiocytes 14 %  


 


Pleural Fluid Mesothelial


Cells 5 % 


  


 


 


Pleural Fluid Total Protein 1.7 GM/DL  


 


Pleural Fluid LDH 71 U/L  


 


Pleural Fluid Glucose 98 MG/DL  


 


White Blood Count  4.2 TH/MM3 


 


Red Blood Count  3.72 MIL/MM3 


 


Hemoglobin  12.3 GM/DL 


 


Hematocrit  35.4 % 


 


Mean Corpuscular Volume  95.1 FL 


 


Mean Corpuscular Hemoglobin  33.1 PG 


 


Mean Corpuscular Hemoglobin


Concent 


  34.8 % 


 


 


Red Cell Distribution Width  14.1 % 


 


Platelet Count  65 TH/MM3 


 


Mean Platelet Volume  8.9 FL 


 


Neutrophils (%) (Auto)  57.8 % 


 


Lymphocytes (%) (Auto)  16.5 % 


 


Monocytes (%) (Auto)  20.3 % 


 


Eosinophils (%) (Auto)  4.3 % 


 


Basophils (%) (Auto)  1.1 % 


 


Neutrophils # (Auto)  2.5 TH/MM3 


 


Lymphocytes # (Auto)  0.7 TH/MM3 


 


Monocytes # (Auto)  0.9 TH/MM3 


 


Eosinophils # (Auto)  0.2 TH/MM3 


 


Basophils # (Auto)  0.0 TH/MM3 


 


CBC Comment  AUTO DIFF 


 


Blood Urea Nitrogen  27 MG/DL 


 


Creatinine  1.23 MG/DL 


 


Random Glucose  93 MG/DL 


 


Total Protein  6.3 GM/DL 


 


Albumin  2.0 GM/DL 


 


Calcium Level  7.9 MG/DL 


 


Alkaline Phosphatase  98 U/L 


 


Aspartate Amino Transf


(AST/SGOT) 


  49 U/L 


 


 


Alanine Aminotransferase


(ALT/SGPT) 


  36 U/L 


 


 


Total Bilirubin  3.0 MG/DL 


 


Sodium Level  134 MEQ/L 


 


Potassium Level  4.0 MEQ/L 


 


Chloride Level  101 MEQ/L 


 


Carbon Dioxide Level  24.0 MEQ/L 


 


Anion Gap  9 MEQ/L 


 


Estimat Glomerular Filtration


Rate 


  60 ML/MIN 


 














 Date/Time


Source Procedure


Growth Status


 


 


 3/29/18 09:32


Fluid Pleural Fluid Gram Stain - Final Resulted


 


 3/29/18 09:32


Fluid Pleural Fluid Body Fluid Culture


Pending Resulted








Physical Examination


HEENT: PERRL; normocephalic; atraumatic; no jaundice.   


CHEST:  diminished lung sounds right middle and lower lung fields.  CTA.


CARDIAC:  RRR


ABDOMEN:  Soft, moderately distended, nontender; no hepatosplenomegaly; bowel 

sounds are present in all four quadrants.


EXTREMITIES: No clubbing, cyanosis, no edema


SKIN:  Normal; no rash; no jaundice.


CNS:  No focal deficits; alert and oriented times three.


 (Saundra Demarco)





Assessment and Plan


Plan


ASSESSMENT


- ascites, elevated LFTs - 2/2 hep c cirrhosis. CT showing cirrhosis, ascites, 

pleural effusion. US abd shows


   chronic GB dz, upper abd ascites, splenomegaly, no gallstones or biliary 

obstruction.  His LFTs are improved from 


   previous admission when we evaluated him 11/2017.  Had paracentesis at that 

time.  He does not feel his abd is more distended


   than is usual for him.


- thrombocytopenia, coagulopathy - 2/2 cirrhosis


- hep C - on Harvoni.  seems to be tolerating, muscle cramps have improved 

since initiation tx  he was supposed to get his hcv quant checked


   3/27 so will order this.  started Harvoni 3/6, has not missed any doses.


- pleural effusion - right lung, with atelectasis, could be 2/2 portal HTN.  s/

p thoracentesis and removal 2600cc.  cites immediate improvement in SOB


     absent lung sounds right lung middle and lower.  pulmonology consult 

pending.





PLAN


- replace albumin 25g TID for 3d


- hcv quant


- await pulmonology consult


- if continues to have effusions may need pleurodesis


-  low sodium diet


- continue diuretics, lactulose


- f/u with GI in office as scheduled





pt seen by myself and Dr Robertson and this note is on his behalf


 (Saundra Demarco)


Physician Comments


Patient seen and examined


Agree with above


Continue with current supportive care


Monitor labs


It is probable that the pleural effusion is secondary to the cirrhosis and 

portal hypertension


Agree with diuretics at this point but would be cautious with the doses so as 

not to induce renal dysfunction


Replacing albumin certainly would help alleviate some of what is going on


Must continue with hepatitis C treatment


 (Sergio Robertson MD)











Saundra Demarco Mar 30, 2018 08:34


Sergio Robertson MD Mar 30, 2018 23:40

## 2018-04-03 LAB — HCV RNA SERPL NAA+PROBE-ACNC: 26 IU/ML

## 2018-04-24 ENCOUNTER — HOSPITAL ENCOUNTER (OUTPATIENT)
Dept: HOSPITAL 17 - HEND | Age: 58
End: 2018-04-24
Attending: INTERNAL MEDICINE
Payer: COMMERCIAL

## 2018-04-24 VITALS
TEMPERATURE: 97.9 F | OXYGEN SATURATION: 94 % | SYSTOLIC BLOOD PRESSURE: 111 MMHG | HEART RATE: 80 BPM | RESPIRATION RATE: 20 BRPM | DIASTOLIC BLOOD PRESSURE: 63 MMHG

## 2018-04-24 VITALS
SYSTOLIC BLOOD PRESSURE: 100 MMHG | RESPIRATION RATE: 20 BRPM | HEART RATE: 78 BPM | DIASTOLIC BLOOD PRESSURE: 66 MMHG | OXYGEN SATURATION: 92 % | TEMPERATURE: 98 F

## 2018-04-24 VITALS — BODY MASS INDEX: 25.77 KG/M2 | HEIGHT: 71 IN | WEIGHT: 184.09 LBS

## 2018-04-24 DIAGNOSIS — K64.4: ICD-10-CM

## 2018-04-24 DIAGNOSIS — K76.6: ICD-10-CM

## 2018-04-24 DIAGNOSIS — B19.20: ICD-10-CM

## 2018-04-24 DIAGNOSIS — Z12.11: Primary | ICD-10-CM

## 2018-04-24 DIAGNOSIS — Z87.19: ICD-10-CM

## 2018-04-24 DIAGNOSIS — Z87.891: ICD-10-CM

## 2018-04-24 DIAGNOSIS — K31.89: ICD-10-CM

## 2018-04-24 DIAGNOSIS — R60.9: ICD-10-CM

## 2018-04-24 DIAGNOSIS — K64.8: ICD-10-CM

## 2018-04-24 DIAGNOSIS — K74.60: ICD-10-CM

## 2018-04-24 PROCEDURE — 45378 DIAGNOSTIC COLONOSCOPY: CPT

## 2018-04-24 PROCEDURE — 00813 ANES UPR LWR GI NDSC PX: CPT

## 2018-04-24 PROCEDURE — 43235 EGD DIAGNOSTIC BRUSH WASH: CPT

## 2018-04-24 NOTE — PD.PROCEDR
GI Procedure





PROCEDURE PERFORMED


EGD followed by a colonoscopy





INDICATION FOR PROCEDURE


Cirrhosis, screening colonoscopy





PROCEDURE:


The procedure, risks and benefits were discussed with Patient/POA and informed


consent was obtained.  Anesthesia sedated Patient with Diprivan.  Patient was 

placed in the left lateral decubitus position.








EGD:


The Pentax videoscope was introduced through the oropharynx and advanced to the 

second portion of the duodenum under direct visualization. Retroflexion was 

performed in the stomach.





FINDINGS:


The esophagus this appeared to be unremarkable with no esophageal varices noted


The stomach there is diffuse mucosal edema with a cobblestone appearance and 

submucosal punctate redness consistent with portal hypertensive gastropathy no 

ulcers no erosions no gastric varices


The duodenum this was normal





Colonoscopy:


The Pentax videoscope was introduced through the rectum and advanced to cecum 

where the ileocecal valve and appendiceal orifice were identified. Retroflexion 

was performed in the rectum. Colonic prep was good





FINDINGS:


Colonic withdrawal time greater than 6 minutes.  As the scope was slowly 

withdrawn colonic mucosa was carefully inspected the colonic mucosa appeared to 

be unremarkable all the way through retroflexion in the rectum does reveal 

moderate size internal hemorrhoids and rectal examination reveals small size 

external hemorrhoids otherwise colonoscopy unremarkable





ESTIMATED BLOOD LOSS:


None





SPECIMENS REMOVED:


None





COMPLICATIONS:


None





IMPRESSION:


Portal gastropathy


Internal and external hemorrhoids





PLAN:


High-fiber diet


Follow-up in clinic in 2-4 weeks


EGD in 1 year


Colonoscopy in 10 years











Sergio Robertson MD Apr 24, 2018 11:36

## 2018-05-09 ENCOUNTER — HOSPITAL ENCOUNTER (OUTPATIENT)
Dept: HOSPITAL 17 - CLAB | Age: 58
End: 2018-05-09
Attending: INTERNAL MEDICINE
Payer: COMMERCIAL

## 2018-05-09 DIAGNOSIS — B19.20: ICD-10-CM

## 2018-05-09 DIAGNOSIS — K74.60: Primary | ICD-10-CM

## 2018-05-09 LAB
ALBUMIN SERPL-MCNC: 3 GM/DL (ref 3.4–5)
ALP SERPL-CCNC: 121 U/L (ref 45–117)
ALT SERPL-CCNC: 46 U/L (ref 12–78)
AST SERPL-CCNC: 78 U/L (ref 15–37)
BASOPHILS # BLD AUTO: 0.1 TH/MM3 (ref 0–0.2)
BASOPHILS NFR BLD: 1.1 % (ref 0–2)
BILIRUB SERPL-MCNC: 3.6 MG/DL (ref 0.2–1)
BUN SERPL-MCNC: 28 MG/DL (ref 7–18)
CALCIUM SERPL-MCNC: 9.2 MG/DL (ref 8.5–10.1)
CHLORIDE SERPL-SCNC: 96 MEQ/L (ref 98–107)
CREAT SERPL-MCNC: 1.49 MG/DL (ref 0.6–1.3)
EOSINOPHIL # BLD: 0.4 TH/MM3 (ref 0–0.4)
EOSINOPHIL NFR BLD: 7.4 % (ref 0–4)
ERYTHROCYTE [DISTWIDTH] IN BLOOD BY AUTOMATED COUNT: 14.4 % (ref 11.6–17.2)
GFR SERPLBLD BASED ON 1.73 SQ M-ARVRAT: 48 ML/MIN (ref 89–?)
HCO3 BLD-SCNC: 23.7 MEQ/L (ref 21–32)
HCT VFR BLD CALC: 42.4 % (ref 39–51)
HGB BLD-MCNC: 14.6 GM/DL (ref 13–17)
LYMPHOCYTES # BLD AUTO: 1.4 TH/MM3 (ref 1–4.8)
LYMPHOCYTES NFR BLD AUTO: 25.7 % (ref 9–44)
MCH RBC QN AUTO: 32.3 PG (ref 27–34)
MCHC RBC AUTO-ENTMCNC: 34.4 % (ref 32–36)
MCV RBC AUTO: 93.9 FL (ref 80–100)
MONOCYTE #: 0.9 TH/MM3 (ref 0–0.9)
MONOCYTES NFR BLD: 17.3 % (ref 0–8)
NEUTROPHILS # BLD AUTO: 2.6 TH/MM3 (ref 1.8–7.7)
NEUTROPHILS NFR BLD AUTO: 48.5 % (ref 16–70)
PLATELET # BLD: 77 TH/MM3 (ref 150–450)
PMV BLD AUTO: 9.6 FL (ref 7–11)
PROT SERPL-MCNC: 7.9 GM/DL (ref 6.4–8.2)
RBC # BLD AUTO: 4.51 MIL/MM3 (ref 4.5–5.9)
SODIUM SERPL-SCNC: 132 MEQ/L (ref 136–145)
WBC # BLD AUTO: 5.4 TH/MM3 (ref 4–11)

## 2018-05-09 PROCEDURE — 87522 HEPATITIS C REVRS TRNSCRPJ: CPT

## 2018-05-09 PROCEDURE — 82105 ALPHA-FETOPROTEIN SERUM: CPT

## 2018-05-09 PROCEDURE — 82140 ASSAY OF AMMONIA: CPT

## 2018-05-09 PROCEDURE — 80053 COMPREHEN METABOLIC PANEL: CPT

## 2018-05-09 PROCEDURE — 36415 COLL VENOUS BLD VENIPUNCTURE: CPT

## 2018-05-09 PROCEDURE — 85025 COMPLETE CBC W/AUTO DIFF WBC: CPT

## 2018-05-14 LAB — HCV RNA SERPL NAA+PROBE-ACNC: (no result) IU/ML

## 2018-06-27 ENCOUNTER — HOSPITAL ENCOUNTER (OUTPATIENT)
Dept: HOSPITAL 17 - CLAB | Age: 58
End: 2018-06-27
Payer: COMMERCIAL

## 2018-06-27 DIAGNOSIS — K74.69: Primary | ICD-10-CM

## 2018-06-27 DIAGNOSIS — R94.4: ICD-10-CM

## 2018-06-27 LAB
ALBUMIN SERPL-MCNC: 2.8 GM/DL (ref 3.4–5)
ALP SERPL-CCNC: 118 U/L (ref 45–117)
ALT SERPL-CCNC: 40 U/L (ref 12–78)
AST SERPL-CCNC: 64 U/L (ref 15–37)
BASOPHILS # BLD AUTO: 0.1 TH/MM3 (ref 0–0.2)
BASOPHILS NFR BLD: 1.2 % (ref 0–2)
BILIRUB SERPL-MCNC: 3.1 MG/DL (ref 0.2–1)
BUN SERPL-MCNC: 23 MG/DL (ref 7–18)
CALCIUM SERPL-MCNC: 8.6 MG/DL (ref 8.5–10.1)
CHLORIDE SERPL-SCNC: 99 MEQ/L (ref 98–107)
CREAT SERPL-MCNC: 1.26 MG/DL (ref 0.6–1.3)
EOSINOPHIL # BLD: 0.4 TH/MM3 (ref 0–0.4)
EOSINOPHIL NFR BLD: 9.3 % (ref 0–4)
ERYTHROCYTE [DISTWIDTH] IN BLOOD BY AUTOMATED COUNT: 14.8 % (ref 11.6–17.2)
GFR SERPLBLD BASED ON 1.73 SQ M-ARVRAT: 59 ML/MIN (ref 89–?)
HCO3 BLD-SCNC: 24.5 MEQ/L (ref 21–32)
HCT VFR BLD CALC: 41.2 % (ref 39–51)
HGB BLD-MCNC: 14 GM/DL (ref 13–17)
INR PPP: 1.3 RATIO
LYMPHOCYTES # BLD AUTO: 1.1 TH/MM3 (ref 1–4.8)
LYMPHOCYTES NFR BLD AUTO: 24.4 % (ref 9–44)
MCH RBC QN AUTO: 31.9 PG (ref 27–34)
MCHC RBC AUTO-ENTMCNC: 34.1 % (ref 32–36)
MCV RBC AUTO: 93.5 FL (ref 80–100)
MONOCYTE #: 0.9 TH/MM3 (ref 0–0.9)
MONOCYTES NFR BLD: 18.8 % (ref 0–8)
NEUTROPHILS # BLD AUTO: 2.1 TH/MM3 (ref 1.8–7.7)
NEUTROPHILS NFR BLD AUTO: 46.3 % (ref 16–70)
PLATELET # BLD: 70 TH/MM3 (ref 150–450)
PMV BLD AUTO: 8.9 FL (ref 7–11)
PROT SERPL-MCNC: 7.6 GM/DL (ref 6.4–8.2)
PROTHROMBIN TIME: 13.4 SEC (ref 9.8–11.6)
RBC # BLD AUTO: 4.41 MIL/MM3 (ref 4.5–5.9)
SODIUM SERPL-SCNC: 132 MEQ/L (ref 136–145)
WBC # BLD AUTO: 4.6 TH/MM3 (ref 4–11)

## 2018-06-27 PROCEDURE — 80053 COMPREHEN METABOLIC PANEL: CPT

## 2018-06-27 PROCEDURE — 36415 COLL VENOUS BLD VENIPUNCTURE: CPT

## 2018-06-27 PROCEDURE — 85025 COMPLETE CBC W/AUTO DIFF WBC: CPT

## 2018-06-27 PROCEDURE — 85610 PROTHROMBIN TIME: CPT
